# Patient Record
Sex: FEMALE | Race: WHITE | URBAN - METROPOLITAN AREA
[De-identification: names, ages, dates, MRNs, and addresses within clinical notes are randomized per-mention and may not be internally consistent; named-entity substitution may affect disease eponyms.]

---

## 2017-05-30 ENCOUNTER — TRANSFERRED RECORDS (OUTPATIENT)
Dept: HEALTH INFORMATION MANAGEMENT | Facility: CLINIC | Age: 37
End: 2017-05-30

## 2018-10-18 ENCOUNTER — TRANSFERRED RECORDS (OUTPATIENT)
Dept: HEALTH INFORMATION MANAGEMENT | Facility: CLINIC | Age: 38
End: 2018-10-18

## 2018-10-23 ENCOUNTER — TELEPHONE (OUTPATIENT)
Dept: OTOLARYNGOLOGY | Facility: CLINIC | Age: 38
End: 2018-10-23

## 2018-11-08 NOTE — TELEPHONE ENCOUNTER
FUTURE VISIT INFORMATION      FUTURE VISIT INFORMATION:    Date: 11/14/18    Time: 11:50AM    Location: [facility]  REFERRAL INFORMATION:    Referring provider:  DR Scar Elliott     Referring providers clinic:  [facility]     Reason for visit/diagnosis  diverticulum     RECORDS REQUESTED FROM:       Clinic name Comments Records Status Imaging Status   [facility] 2/13/07, 11/24/10, 12/8/11, 12/18/13, 6/2/17, 5/5/18, 10/18/18, 10/18/18 office notes In Drawer    [facility] 5/30/17 XR Esophagram Barium   10/17/13 Esophagram Barium   12/6/11 XR Esophagram Barium  Report: [facility] Only images are for the 5/30/17 in PACS                               11/8/18 5:51PM sent a fax via [employer] for records to be sent from [facility] - [place]  11/12/18 1PM called [facility] to get 5/30/17 Esophagram images pushed - [place]

## 2018-11-11 ENCOUNTER — HEALTH MAINTENANCE LETTER (OUTPATIENT)
Age: 38
End: 2018-11-11

## 2018-11-14 ENCOUNTER — PRE VISIT (OUTPATIENT)
Dept: OTOLARYNGOLOGY | Facility: CLINIC | Age: 38
End: 2018-11-14

## 2018-11-14 ENCOUNTER — TELEPHONE (OUTPATIENT)
Dept: OTOLARYNGOLOGY | Facility: CLINIC | Age: 38
End: 2018-11-14

## 2018-11-14 NOTE — TELEPHONE ENCOUNTER
[facility] Call Center    Phone Message    May a detailed message be left on voicemail: yes    Reason for Call: Other: Pt canceled her appt. on 11/14/18 due to car trouble. I searched through Feb. and couldn't find any appt.s for both providers Marsha and Wiliam on the same day and time.      Action Taken: Message routed to:  [facility] ([facility]): Can you check into it and see if you can find something much sooner, then call the Pt to confirm
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
## 2018-11-28 ENCOUNTER — THERAPY VISIT (OUTPATIENT)
Dept: SPEECH THERAPY | Facility: CLINIC | Age: 38
End: 2018-11-28
Payer: MEDICARE

## 2018-11-28 ENCOUNTER — OFFICE VISIT (OUTPATIENT)
Dept: OTOLARYNGOLOGY | Facility: CLINIC | Age: 38
End: 2018-11-28
Payer: MEDICARE

## 2018-11-28 ENCOUNTER — RADIANT APPOINTMENT (OUTPATIENT)
Dept: GENERAL RADIOLOGY | Facility: CLINIC | Age: 38
End: 2018-11-28
Attending: OTOLARYNGOLOGY
Payer: MEDICARE

## 2018-11-28 VITALS — BODY MASS INDEX: 18.05 KG/M2 | HEIGHT: 67 IN | WEIGHT: 115 LBS

## 2018-11-28 DIAGNOSIS — K22.5 ZENKER'S DIVERTICULUM: Primary | ICD-10-CM

## 2018-11-28 DIAGNOSIS — K22.5 ZENKER'S DIVERTICULUM: ICD-10-CM

## 2018-11-28 DIAGNOSIS — R13.14 PHARYNGOESOPHAGEAL DYSPHAGIA: Primary | ICD-10-CM

## 2018-11-28 RX ORDER — DEXTROAMPHETAMINE SACCHARATE, AMPHETAMINE ASPARTATE, DEXTROAMPHETAMINE SULFATE AND AMPHETAMINE SULFATE 3.75; 3.75; 3.75; 3.75 MG/1; MG/1; MG/1; MG/1
15 TABLET ORAL EVERY MORNING
COMMUNITY

## 2018-11-28 RX ORDER — BARIUM SULFATE 400 MG/ML
10 SUSPENSION ORAL ONCE
Status: COMPLETED | OUTPATIENT
Start: 2018-11-28 | End: 2018-11-28

## 2018-11-28 RX ORDER — SENNOSIDES 8.6 MG
CAPSULE ORAL
COMMUNITY

## 2018-11-28 RX ADMIN — BARIUM SULFATE 10 ML: 400 SUSPENSION ORAL at 10:43

## 2018-11-28 ASSESSMENT — PAIN SCALES - GENERAL: PAINLEVEL: SEVERE PAIN (6)
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
## 2018-11-28 NOTE — MR AVS SNAPSHOT
After Visit Summary   11/28/2018    Kenya Villalta    MRN: [id number]           Patient Information     Date Of Birth          1980        Visit Information        Provider Department      11/28/2018 10:00 AM Yecenia Swain SLP [facility]        Today's Diagnoses     Pharyngoesophageal dysphagia    -  1    Zenker's diverticulum           Follow-ups after your visit        Who to contact     Please call your clinic at [phone] to:    Ask questions about your health    Make or cancel appointments    Discuss your medicines    Learn about your test results    Speak to your doctor            Additional Information About Your Visit        MyChart Information     [email or web address] gives you secure access to your electronic health record. If you see a primary care provider, you can also send messages to your care team and make appointments. If you have questions, please call your primary care clinic.  If you do not have a primary care provider, please call [phone] and they will assist you.      [email or web address] is an electronic gateway that provides easy, online access to your medical records. With [email or web address], you can request a clinic appointment, read your test results, renew a prescription or communicate with your care team.     To access your existing account, please contact your [facility] or call [phone] for assistance.        Care EveryWhere ID     This is your Care EveryWhere ID. This could be used by other organizations to access your [place] medical records  [id number]         Blood Pressure from Last 3 Encounters:   No data found for BP    Weight from Last 3 Encounters:   No data found for Wt              Today, you had the following     No orders found for display         Today's Medication Changes          These changes are accurate as of 11/28/18 11:59 PM.  If you have any questions, ask your nurse or doctor.               Stop taking these medicines if you  haven't already. Please contact your care team if you have questions.     AUGMENTIN PO   Stopped by:  Marcelino Larson MD           FOSAMAX PO   Stopped by:  Marcelino Larson MD           METHADONE HCL PO   Stopped by:  Marcelino Larson MD           REMERON PO   Stopped by:  Marcelino Larson MD                    Primary Care Provider Office Phone # Fax #    Antonino [facility] [phone]       [address]  [place] [address]        Equal Access to Services     [facility]: Hadii aad ku hadasho Soomaali, waaxda luqadaha, qaybta kaalmada adeegyada, waxay idiin hayaan adeeg kharash la'aan . So [facility] [phone].    ATENCIÓN: Si habla español, tiene a vance disposición servicios gratuitos de asistencia lingüística. [facility] [phone].    We comply with applicable federal civil rights laws and [place] laws. We do not discriminate on the basis of race, color, national origin, age, disability, sex, sexual orientation, or gender identity.            Thank you!     Thank you for choosing [facility]  for your care. Our goal is always to provide you with excellent care. Hearing back from our patients is one way we can continue to improve our services. Please take a few minutes to complete the written survey that you may receive in the mail after your visit with us. Thank you!             Your Updated Medication List - Protect others around you: Learn how to safely use, store and throw away your medicines at www.disposemymeds.org.          This list is accurate as of 11/28/18 11:59 PM.  Always use your most recent med list.                   Brand Name Dispense Instructions for use Diagnosis    amphetamine-dextroamphetamine 15 MG tablet    ADDERALL          BACLOFEN PO      Take 20 mg by mouth 4 times daily        OMEPRAZOLE PO      Take by mouth every morning        oxyCODONE-acetaminophen 5-325 MG tablet    PERCOCET     Take by mouth every 6 hours as needed for moderate to severe pain        Polyethylene Glycol  3350-GRX Powd           Senna 8.6 MG Caps

## 2018-11-28 NOTE — MR AVS SNAPSHOT
After Visit Summary   11/28/2018    Kenya Villalta    MRN: [id number]           Patient Information     Date Of Birth          1980        Visit Information        Provider Department      11/28/2018 7:00 AM Marcelino Larson MD [facility] Nose [facility] Th[facility]        Today's Diagnoses     Zenker's diverticulum    -  1       Follow-ups after your visit        Additional Services     Speech Therapy Referral       If you have not heard from the scheduling office within 2 business days, please call [phone] for all locations, with the exception of [place], please call [phone] and [place], please call [phone].    Please be aware that coverage of these services is subject to the terms and limitations of your health insurance plan.  Call member services at your health plan with any benefit or coverage questions.                  Who to contact     Please call your clinic at [phone] to:    Ask questions about your health    Make or cancel appointments    Discuss your medicines    Learn about your test results    Speak to your doctor            Additional Information About Your Visit        [employer] Information     [employer] gives you secure access to your electronic health record. If you see a primary care provider, you can also send messages to your care team and make appointments. If you have questions, please call your primary care clinic.  If you do not have a primary care provider, please call [phone] and they will assist you.      [employer] is an electronic gateway that provides easy, online access to your medical records. With [employer], you can request a clinic appointment, read your test results, renew a prescription or communicate with your care team.     To access your existing account, please contact your [facility] or call [phone] for assistance.        Care EveryWhere ID     This is your Care EveryWhere ID. This could be used by other  "organizations to access your [place] medical records  [id number]        Your Vitals Were     Height BMI (Body Mass Index)                1.702 m (5' 7\") 18.01 kg/m2           Blood Pressure from Last 3 Encounters:   11/03/14 (!) 83/49    Weight from Last 3 Encounters:   11/28/18 52.2 kg (115 lb)   02/16/15 54.4 kg (120 lb)              We Performed the Following     IMAGESTREAM RECORDING ORDER     LARYNGOSCOPY FLEX FIBEROPTIC, DIAGNOSTIC          Today's Medication Changes          These changes are accurate as of 11/28/18  1:51 PM.  If you have any questions, ask your nurse or doctor.               Stop taking these medicines if you haven't already. Please contact your care team if you have questions.     AUGMENTIN PO   Stopped by:  Marcelino Larson MD           FOSAMAX PO   Stopped by:  Marcelino Larson MD           METHADONE HCL PO   Stopped by:  Marcelino Larson MD           REMERON PO   Stopped by:  Marcelino Larson MD                    Primary Care Provider Office Phone # Fax #    [facility] [phone]       [address] [place] [address]        Equal Access to Services     [facility]: Hadii makayla ku hadasho Soomaali, waaxda luqadaha, qaybta kaalmada bhupendra, julio bush . So [facility] [phone].    ATENCIÓN: Si habla español, tiene a vance disposición servicios gratuitos de asistencia lingüística. Llame al [phone].    We comply with applicable federal civil rights laws and [place] laws. We do not discriminate on the basis of race, color, national origin, age, disability, sex, sexual orientation, or gender identity.            Thank you!     Thank you for choosing [facility] EAR [facility]SE [facility] TH[facility]AT  for your care. Our goal is always to provide you with excellent care. Hearing back from our patients is one way we can continue to improve our services. Please take a few minutes to complete the written survey that you may receive in the mail after your visit with us. Thank " you!             Your Updated Medication List - Protect others around you: Learn how to safely use, store and throw away your medicines at www.disposemymeds.org.          This list is accurate as of 11/28/18  1:51 PM.  Always use your most recent med list.                   Brand Name Dispense Instructions for use Diagnosis    amphetamine-dextroamphetamine 15 MG tablet    ADDERALL          BACLOFEN PO      Take 20 mg by mouth 4 times daily        OMEPRAZOLE PO      Take by mouth every morning        oxyCODONE-acetaminophen 5-325 MG tablet    PERCOCET     Take by mouth every 6 hours as needed for moderate to severe pain        Polyethylene Glycol 3350-GRX Powd           Senna 8.6 MG Caps

## 2018-11-28 NOTE — NURSING NOTE
Chief Complaint   Patient presents with     Consult     Zenker's Diverticulum     Leonidas Reddy, EMT

## 2018-12-06 ENCOUNTER — CARE COORDINATION (OUTPATIENT)
Dept: GASTROENTEROLOGY | Facility: CLINIC | Age: 38
End: 2018-12-06

## 2018-12-06 ENCOUNTER — TELEPHONE (OUTPATIENT)
Dept: OTOLARYNGOLOGY | Facility: CLINIC | Age: 38
End: 2018-12-06

## 2018-12-06 ENCOUNTER — TELEPHONE (OUTPATIENT)
Dept: GASTROENTEROLOGY | Facility: CLINIC | Age: 38
End: 2018-12-06

## 2018-12-06 DIAGNOSIS — K22.5 ZENKER'S DIVERTICULUM: Primary | ICD-10-CM

## 2018-12-06 NOTE — TELEPHONE ENCOUNTER
I spoke with Dr. Barahona's clinic they will send a message and have someone call and schedule an appt  Pt is being referred by Dr. Larson to see Azeem, Nabeel Dx Zenker's diverticulum

## 2018-12-06 NOTE — PROGRESS NOTES
[facility] Procedure Referral       Referring provider: Dr. KAISER Larson - ENT     Referred to: [facility]: Specific provider: Jun     Referral Received: 12/6/18    Records received: 12/6/18    Images received: 12/6/18    MD review date: 12/6/18    Requested procedure: EGD w/ endoscopic resection of zenker's diverticulum      Recommendations/Orders:     Per message from Dr. Barahona pt to be scheduled for a clinic visit and then an EGD w/ zenkers diverticulotomy

## 2018-12-06 NOTE — TELEPHONE ENCOUNTER
Called patient to let her know that she should see GI for an endoscopic procedure for her diverticulum. Patient in agreement with recommendations. Message sent to schedulers to assist patient in setting up appointment with Dr. Bobo. RN gave patient contact information for questions/concerns.

## 2018-12-07 ENCOUNTER — TELEPHONE (OUTPATIENT)
Dept: GASTROENTEROLOGY | Facility: CLINIC | Age: 38
End: 2018-12-07

## 2018-12-07 NOTE — TELEPHONE ENCOUNTER
Left VM informing pt I have sent all scheduling information to her [employer]. I left my direct line as well to call if pt does not have access to [employer].    SR 12/7/2018 @ 956 A

## 2018-12-27 NOTE — PROGRESS NOTES
11/28/18 1000   General Information   Type Of Visit Initial   Start Of Care Date 11/28/18   Referring Physician Dr. Marcelino Larson   Orders Evaluate And Treat   Orders Comment Clinical and Video Swallow Studies   Medical Diagnosis Zenker's diverticulum    Onset Of Illness/injury Or Date Of Surgery 11/28/18   Precautions/limitations Swallowing Precautions   Hearing WFL   Pertinent History of Current Problem/OT: Additional Occupational Profile Info Ms. Villalta has a history of spinal cord injury resulting in quadriplegia. She has reported increased difficulty swallowing. She has had several choking episodes. She participated in barium swallow however this was in they laying position due to her inabiilty to stand. She is completing her video swallow study today to see her swallow function in a more natural position and determine risk for aspiration/choking with various consistencies.    Respiratory Status Room air   Prior Level Of Function Swallowing   Prior Level Of Function Comment Soft solids and thin liquids   Patient Role/employment History Employed   Home/community Accessibility Comments (flowsheet Row) Pt is able to get around in her motorized wheelchair.    General Observations Pt highly pleasant and cooperative throughout evaluation.    Patient/family Goals Pt would like to swallow with greater ease and minimize risk for choking.    Clinical Swallow Evaluation   Oral Musculature generally intact   Dentition present and adequate   Mucosal Quality good   Mandibular Strength and Mobility intact   Oral Labial Strength and Mobility WFL   Lingual Strength and Mobility WFL   Velar Elevation intact   Buccal Strength and Mobility intact   Laryngeal Function Throat clear;Cough;Swallow;Voicing initiated   Oral Musculature Comments Pt demonstrates decreased cough strength due to quadriplegia   VFSS Eval: Radiology   Radiologist Resident   Views Taken left lateral;A/P   Physical Location of Procedure [facility]   VFSS  Eval: Thin Liquid Texture Trial   Mode of Presentation, Thin Liquid straw;fed by clinician   Order of Presentation 1,2,3,5   Preparatory Phase WFL   Oral Phase, Thin Liquid WFL   Pharyngeal Phase, Thin Liquid Residue in pyriform sinus  (Zenker's diverticulum)   Rosenbek's Penetration Aspiration Scale: Thin Liquid Trial Results 1 - no aspiration, contrast does not enter airway   Diagnostic Statement No aspiration/penetration noted on thin liquid trials.    VFSS Eval: Nectar Thick Liquid Texture Trial   Mode of Presentation, Nectar straw;fed by clinician   Order of Presentation 6   Preparatory Phase WFL   Oral Phase, Nectar WFL   Pharyngeal Phase, Igo WFL   Rosenbek's Penetration Aspiration Scale: Nectar-Thick Liquid Trial Results 1 - no aspiration, contrast does not enter airway   Diagnostic Statement No aspiration/penetration noted on nectar thick liquid trial.    VFSS Eval: Puree Solid Texture Trial   Mode of Presentation, Puree spoon;fed by clinician   Order of Presentation 4   Preparatory Phase WFL   Oral Phase, Puree WFL   Pharyngeal Phase, Puree WFL;Residue in pyriform sinus  (refluxed from Zenker's )   Rosenbek's Penetration Aspiration Scale: Puree Food Trial Results 1 - no aspiration, contrast does not enter airway   Diagnostic Statement No aspiration/penetration noted on puree consistency trial.  Residue noted which was noted in part due to reflux from Zenker's   Educational Assessment   Barriers to Learning No barriers   Esophageal Phase of Swallow   Esophageal comments Zenker's diverticulum noted which hinders bolus flow.    Swallow Eval: Clinical Impressions   Skilled Criteria for Therapy Intervention Skilled criteria met.  Treatment indicated.   Dysphagia Outcome Severity Scale (BRENNAN) Level 4 - BRENNAN   Treatment Diagnosis Mild to moderate pharyngoesophgeal dysphagia   Diet texture recommendations Regular diet;Thin liquids   Recommended Feeding/Eating Techniques alternate between small bites and sips of  food/liquid;maintain upright posture during/after eating for 30 mins;small sips/bites   Predicted Duration of Therapy Intervention (days/wks) Evaluation only   Risks and Benefits of Treatment have been explained. Yes   Patient, family and/or staff in agreement with Plan of Care Yes   Clinical Impression Comments Ms. Villalta demonstrates mild to moderate pharyngoesophgeal dysphagia as characterized by decreased bolus flow through the pharynx. She is noted to have decreased pharyngeal squeeze and reflux back into pharynx from zenkers diverticulum. Pt demonstrates no aspiration/penetration during evaluation however is at high risk due to minimal cough strength at baseline. She is able to resolve some of the residual with multiple swallows. Recommend she continue with regular soldis and thin liquids. She will continue to take small bites/sips and eat/drink slowly. Management of her Zenker's will improve swallow function as well as minimize her risk for aspiration/choking.    Total Session Time   Total Evaluation Time 30   SLP G-Codes   G-code Swallowing   Swallow Current Status () CK   Swallow Current Status Modifier Rationale Modifier chosen based on the results of this evaluation and reference to RAJAT guidelines   Swallow Goal Status () CK   Swallow Discharge Status () CK

## 2019-01-16 ENCOUNTER — ANESTHESIA EVENT (OUTPATIENT)
Dept: SURGERY | Facility: CLINIC | Age: 39
DRG: 326 | End: 2019-01-16
Payer: MEDICARE

## 2019-01-16 ENCOUNTER — OFFICE VISIT (OUTPATIENT)
Dept: GASTROENTEROLOGY | Facility: CLINIC | Age: 39
End: 2019-01-16
Payer: MEDICARE

## 2019-01-16 ENCOUNTER — OFFICE VISIT (OUTPATIENT)
Dept: SURGERY | Facility: CLINIC | Age: 39
End: 2019-01-16
Payer: MEDICARE

## 2019-01-16 VITALS — TEMPERATURE: 97.3 F | DIASTOLIC BLOOD PRESSURE: 52 MMHG | SYSTOLIC BLOOD PRESSURE: 87 MMHG | HEART RATE: 82 BPM

## 2019-01-16 VITALS
SYSTOLIC BLOOD PRESSURE: 87 MMHG | DIASTOLIC BLOOD PRESSURE: 52 MMHG | OXYGEN SATURATION: 95 % | TEMPERATURE: 97.3 F | HEART RATE: 86 BPM

## 2019-01-16 DIAGNOSIS — K22.5 ZENKER DIVERTICULA: Primary | ICD-10-CM

## 2019-01-16 DIAGNOSIS — Z01.818 PRE-OP EXAMINATION: Primary | ICD-10-CM

## 2019-01-16 DIAGNOSIS — R13.10 DYSPHAGIA: ICD-10-CM

## 2019-01-16 DIAGNOSIS — T17.908A ASPIRATION INTO AIRWAY: ICD-10-CM

## 2019-01-16 ASSESSMENT — LIFESTYLE VARIABLES: TOBACCO_USE: 0

## 2019-01-16 NOTE — NURSING NOTE
Chief Complaint   Patient presents with     Consult     NEW        Vitals:    01/16/19 1449   BP: (!) 87/52   Pulse: 82   Temp: 97.3  F (36.3  C)   TempSrc: Oral       There is no height or weight on file to calculate BMI.      Chris Singletary, EMT on 1/16/2019 at 2:50 PM
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
## 2019-01-16 NOTE — PATIENT INSTRUCTIONS
Preparing for Your Surgery      Name:  Kenya Villalta   MRN:  [id number]   :  1980   Today's Date:  2019     Arriving for surgery: Upper Endoscopy   Surgery date:  2019  Arrival time:  7:30 am  Please come to:       [facility] [address]  [address] [place]  [address]    -   parking is available in front of the hospital from 5:15 am to 8:00 pm    -  Stop at the Information Desk in the lobby    -   Inform the information person that you are here for surgery. An escort to 3c will be provided. If you would not like an escort, please proceed to 3C on the 3rd floor. [phone]     What can I eat or drink?  -  You may have solid food or milk products until 8 hours prior to your surgery. (midnight)  -  You may have water, apple juice or 7up/Sprite until 2 hours prior to your surgery.(until 7:30 am arrival time)    Which medicines can I take?        Stop Aspirin, vitamins and supplements one week prior to surgery.      Hold Ibuprofen and Naproxen for 24 hours prior to surgery.     -  Do NOT take these medications in the morning, the day of surgery:     Adderall      -  Please take these medications the day of surgery:      Baclofen       Omeprazole       How do I prepare myself?  -  Take two showers: one the night before surgery; and one the morning of surgery.         Use Scrubcare or Hibiclens to wash from neck down.  You may use your own shampoo and conditioner. No other hair products.   -  Do NOT use lotion, powder, deodorant, or antiperspirant the day of your surgery.  -  Do NOT wear any makeup, fingernail polish or jewelry.  - Do not bring your own medications to the hospital, except for inhalers and eye drops.  -  Bring your ID and insurance card.      -- Bring bi-pap machine to hospital     Questions or Concerns:  -If you have questions or concerns regarding the day of surgery, please call [phone].       -For questions after surgery please  call your surgeons office.

## 2019-01-17 ENCOUNTER — HOSPITAL ENCOUNTER (INPATIENT)
Facility: CLINIC | Age: 39
LOS: 5 days | Discharge: HOME | DRG: 326 | End: 2019-01-23
Attending: INTERNAL MEDICINE | Admitting: FAMILY MEDICINE
Payer: MEDICARE

## 2019-01-17 ENCOUNTER — ANESTHESIA (OUTPATIENT)
Dept: SURGERY | Facility: CLINIC | Age: 39
DRG: 326 | End: 2019-01-17
Payer: MEDICARE

## 2019-01-17 DIAGNOSIS — K91.89 ESOPHAGEAL ANASTOMOTIC LEAK: ICD-10-CM

## 2019-01-17 DIAGNOSIS — K22.5 ZENKER'S DIVERTICULUM: Primary | ICD-10-CM

## 2019-01-17 DIAGNOSIS — K22.5 ZENKER DIVERTICULA: ICD-10-CM

## 2019-01-17 DIAGNOSIS — R13.10 DYSPHAGIA: ICD-10-CM

## 2019-01-17 DIAGNOSIS — T17.908A ASPIRATION INTO AIRWAY: ICD-10-CM

## 2019-01-17 LAB
BUN SERPL-MCNC: 7 MG/DL (ref 7–30)
ERYTHROCYTE [DISTWIDTH] IN BLOOD BY AUTOMATED COUNT: 14 % (ref 10–15)
GLUCOSE BLDC GLUCOMTR-MCNC: 91 MG/DL (ref 70–99)
HCG UR QL: NEGATIVE
HCT VFR BLD AUTO: 38.9 % (ref 35–47)
HGB BLD-MCNC: 12.8 G/DL (ref 11.7–15.7)
INR PPP: 1.1 (ref 0.86–1.14)
MCH RBC QN AUTO: 29 PG (ref 26.5–33)
MCHC RBC AUTO-ENTMCNC: 32.9 G/DL (ref 31.5–36.5)
MCV RBC AUTO: 88 FL (ref 78–100)
PLATELET # BLD AUTO: 206 10E9/L (ref 150–450)
POTASSIUM SERPL-SCNC: 3.7 MMOL/L (ref 3.4–5.3)
RBC # BLD AUTO: 4.41 10E12/L (ref 3.8–5.2)
UPPER GI ENDOSCOPY: NORMAL
WBC # BLD AUTO: 8.7 10E9/L (ref 4–11)

## 2019-01-17 PROCEDURE — 36000053 ZZH SURGERY LEVEL 2 EA 15 ADDTL MIN - UMMC: Performed by: INTERNAL MEDICINE

## 2019-01-17 PROCEDURE — 00000146 ZZHCL STATISTIC GLUCOSE BY METER IP

## 2019-01-17 PROCEDURE — 25000566 ZZH SEVOFLURANE, EA 15 MIN: Performed by: INTERNAL MEDICINE

## 2019-01-17 PROCEDURE — 84132 ASSAY OF SERUM POTASSIUM: CPT | Performed by: INTERNAL MEDICINE

## 2019-01-17 PROCEDURE — 25000128 H RX IP 250 OP 636: Performed by: NURSE ANESTHETIST, CERTIFIED REGISTERED

## 2019-01-17 PROCEDURE — 37000009 ZZH ANESTHESIA TECHNICAL FEE, EACH ADDTL 15 MIN: Performed by: INTERNAL MEDICINE

## 2019-01-17 PROCEDURE — 25000128 H RX IP 250 OP 636: Performed by: ANESTHESIOLOGY

## 2019-01-17 PROCEDURE — C1769 GUIDE WIRE: HCPCS | Performed by: INTERNAL MEDICINE

## 2019-01-17 PROCEDURE — 71000014 ZZH RECOVERY PHASE 1 LEVEL 2 FIRST HR: Performed by: INTERNAL MEDICINE

## 2019-01-17 PROCEDURE — 37000008 ZZH ANESTHESIA TECHNICAL FEE, 1ST 30 MIN: Performed by: INTERNAL MEDICINE

## 2019-01-17 PROCEDURE — 25000132 ZZH RX MED GY IP 250 OP 250 PS 637: Mod: GY | Performed by: STUDENT IN AN ORGANIZED HEALTH CARE EDUCATION/TRAINING PROGRAM

## 2019-01-17 PROCEDURE — 25000125 ZZHC RX 250: Performed by: NURSE ANESTHETIST, CERTIFIED REGISTERED

## 2019-01-17 PROCEDURE — 25000128 H RX IP 250 OP 636: Performed by: FAMILY MEDICINE

## 2019-01-17 PROCEDURE — 84520 ASSAY OF UREA NITROGEN: CPT | Performed by: INTERNAL MEDICINE

## 2019-01-17 PROCEDURE — 36000051 ZZH SURGERY LEVEL 2 1ST 30 MIN - UMMC: Performed by: INTERNAL MEDICINE

## 2019-01-17 PROCEDURE — A9270 NON-COVERED ITEM OR SERVICE: HCPCS | Performed by: INTERNAL MEDICINE

## 2019-01-17 PROCEDURE — 25000128 H RX IP 250 OP 636: Performed by: STUDENT IN AN ORGANIZED HEALTH CARE EDUCATION/TRAINING PROGRAM

## 2019-01-17 PROCEDURE — 25000125 ZZHC RX 250: Performed by: INTERNAL MEDICINE

## 2019-01-17 PROCEDURE — 36415 COLL VENOUS BLD VENIPUNCTURE: CPT | Performed by: INTERNAL MEDICINE

## 2019-01-17 PROCEDURE — 0K844ZZ DIVISION OF TONGUE, PALATE, PHARYNX MUSCLE, PERCUTANEOUS ENDOSCOPIC APPROACH: ICD-10-PCS | Performed by: INTERNAL MEDICINE

## 2019-01-17 PROCEDURE — 81025 URINE PREGNANCY TEST: CPT | Performed by: ANESTHESIOLOGY

## 2019-01-17 PROCEDURE — G0378 HOSPITAL OBSERVATION PER HR: HCPCS

## 2019-01-17 PROCEDURE — 40000170 ZZH STATISTIC PRE-PROCEDURE ASSESSMENT II: Performed by: INTERNAL MEDICINE

## 2019-01-17 PROCEDURE — 85027 COMPLETE CBC AUTOMATED: CPT | Performed by: INTERNAL MEDICINE

## 2019-01-17 PROCEDURE — A9270 NON-COVERED ITEM OR SERVICE: HCPCS | Mod: GY | Performed by: STUDENT IN AN ORGANIZED HEALTH CARE EDUCATION/TRAINING PROGRAM

## 2019-01-17 PROCEDURE — 85610 PROTHROMBIN TIME: CPT | Performed by: INTERNAL MEDICINE

## 2019-01-17 PROCEDURE — 27210794 ZZH OR GENERAL SUPPLY STERILE: Performed by: INTERNAL MEDICINE

## 2019-01-17 RX ORDER — METHOCARBAMOL 100 MG/ML
1000 INJECTION, SOLUTION INTRAMUSCULAR; INTRAVENOUS
Status: DISCONTINUED | OUTPATIENT
Start: 2019-01-17 | End: 2019-01-18

## 2019-01-17 RX ORDER — ONDANSETRON 2 MG/ML
4 INJECTION INTRAMUSCULAR; INTRAVENOUS EVERY 6 HOURS PRN
Status: CANCELLED | OUTPATIENT
Start: 2019-01-17

## 2019-01-17 RX ORDER — ONDANSETRON 2 MG/ML
4 INJECTION INTRAMUSCULAR; INTRAVENOUS EVERY 6 HOURS PRN
Status: DISCONTINUED | OUTPATIENT
Start: 2019-01-17 | End: 2019-01-24 | Stop reason: HOSPADM

## 2019-01-17 RX ORDER — CEFAZOLIN SODIUM 2 G/100ML
2 INJECTION, SOLUTION INTRAVENOUS
Status: COMPLETED | OUTPATIENT
Start: 2019-01-17 | End: 2019-01-17

## 2019-01-17 RX ORDER — ONDANSETRON 4 MG/1
4 TABLET, ORALLY DISINTEGRATING ORAL EVERY 6 HOURS PRN
Status: CANCELLED | OUTPATIENT
Start: 2019-01-17

## 2019-01-17 RX ORDER — MORPHINE SULFATE 2 MG/ML
1 INJECTION, SOLUTION INTRAMUSCULAR; INTRAVENOUS EVERY 4 HOURS PRN
Status: DISCONTINUED | OUTPATIENT
Start: 2019-01-17 | End: 2019-01-18

## 2019-01-17 RX ORDER — FENTANYL CITRATE 50 UG/ML
INJECTION, SOLUTION INTRAMUSCULAR; INTRAVENOUS PRN
Status: DISCONTINUED | OUTPATIENT
Start: 2019-01-17 | End: 2019-01-17

## 2019-01-17 RX ORDER — DEXAMETHASONE SODIUM PHOSPHATE 4 MG/ML
INJECTION, SOLUTION INTRA-ARTICULAR; INTRALESIONAL; INTRAMUSCULAR; INTRAVENOUS; SOFT TISSUE PRN
Status: DISCONTINUED | OUTPATIENT
Start: 2019-01-17 | End: 2019-01-17

## 2019-01-17 RX ORDER — NALOXONE HYDROCHLORIDE 0.4 MG/ML
.1-.4 INJECTION, SOLUTION INTRAMUSCULAR; INTRAVENOUS; SUBCUTANEOUS
Status: DISCONTINUED | OUTPATIENT
Start: 2019-01-17 | End: 2019-01-17

## 2019-01-17 RX ORDER — DIAZEPAM 10 MG/2ML
2.5 INJECTION, SOLUTION INTRAMUSCULAR; INTRAVENOUS EVERY 4 HOURS PRN
Status: DISCONTINUED | OUTPATIENT
Start: 2019-01-17 | End: 2019-01-17

## 2019-01-17 RX ORDER — LIDOCAINE HYDROCHLORIDE 20 MG/ML
INJECTION, SOLUTION INFILTRATION; PERINEURAL PRN
Status: DISCONTINUED | OUTPATIENT
Start: 2019-01-17 | End: 2019-01-17

## 2019-01-17 RX ORDER — FENTANYL CITRATE 50 UG/ML
25-50 INJECTION, SOLUTION INTRAMUSCULAR; INTRAVENOUS
Status: DISCONTINUED | OUTPATIENT
Start: 2019-01-17 | End: 2019-01-17 | Stop reason: HOSPADM

## 2019-01-17 RX ORDER — SODIUM CHLORIDE, SODIUM LACTATE, POTASSIUM CHLORIDE, CALCIUM CHLORIDE 600; 310; 30; 20 MG/100ML; MG/100ML; MG/100ML; MG/100ML
INJECTION, SOLUTION INTRAVENOUS CONTINUOUS
Status: DISCONTINUED | OUTPATIENT
Start: 2019-01-17 | End: 2019-01-17 | Stop reason: HOSPADM

## 2019-01-17 RX ORDER — NALOXONE HYDROCHLORIDE 0.4 MG/ML
.1-.4 INJECTION, SOLUTION INTRAMUSCULAR; INTRAVENOUS; SUBCUTANEOUS
Status: DISCONTINUED | OUTPATIENT
Start: 2019-01-17 | End: 2019-01-24 | Stop reason: HOSPADM

## 2019-01-17 RX ORDER — ONDANSETRON 2 MG/ML
INJECTION INTRAMUSCULAR; INTRAVENOUS PRN
Status: DISCONTINUED | OUTPATIENT
Start: 2019-01-17 | End: 2019-01-17

## 2019-01-17 RX ORDER — ONDANSETRON 4 MG/1
4 TABLET, ORALLY DISINTEGRATING ORAL EVERY 30 MIN PRN
Status: DISCONTINUED | OUTPATIENT
Start: 2019-01-17 | End: 2019-01-17 | Stop reason: HOSPADM

## 2019-01-17 RX ORDER — BACLOFEN 20 MG/1
20 TABLET ORAL 4 TIMES DAILY
Status: DISCONTINUED | OUTPATIENT
Start: 2019-01-17 | End: 2019-01-17

## 2019-01-17 RX ORDER — PROPOFOL 10 MG/ML
INJECTION, EMULSION INTRAVENOUS PRN
Status: DISCONTINUED | OUTPATIENT
Start: 2019-01-17 | End: 2019-01-17

## 2019-01-17 RX ORDER — FLUMAZENIL 0.1 MG/ML
0.2 INJECTION, SOLUTION INTRAVENOUS
Status: ACTIVE | OUTPATIENT
Start: 2019-01-17 | End: 2019-01-18

## 2019-01-17 RX ORDER — ONDANSETRON 2 MG/ML
4 INJECTION INTRAMUSCULAR; INTRAVENOUS EVERY 30 MIN PRN
Status: DISCONTINUED | OUTPATIENT
Start: 2019-01-17 | End: 2019-01-17 | Stop reason: HOSPADM

## 2019-01-17 RX ORDER — BACLOFEN 20 MG/1
20 TABLET ORAL 4 TIMES DAILY
Status: DISCONTINUED | OUTPATIENT
Start: 2019-01-17 | End: 2019-01-18

## 2019-01-17 RX ORDER — LIDOCAINE 40 MG/G
CREAM TOPICAL
Status: DISCONTINUED | OUTPATIENT
Start: 2019-01-17 | End: 2019-01-17 | Stop reason: HOSPADM

## 2019-01-17 RX ADMIN — BACLOFEN 20 MG: 20 TABLET ORAL at 23:38

## 2019-01-17 RX ADMIN — MORPHINE SULFATE 1 MG: 2 INJECTION, SOLUTION INTRAMUSCULAR; INTRAVENOUS at 20:07

## 2019-01-17 RX ADMIN — LIDOCAINE HYDROCHLORIDE 75 MG: 20 INJECTION, SOLUTION INFILTRATION; PERINEURAL at 09:40

## 2019-01-17 RX ADMIN — SODIUM CHLORIDE 500 ML: 9 INJECTION, SOLUTION INTRAVENOUS at 18:15

## 2019-01-17 RX ADMIN — FENTANYL CITRATE 25 MCG: 50 INJECTION INTRAMUSCULAR; INTRAVENOUS at 12:55

## 2019-01-17 RX ADMIN — MORPHINE SULFATE 1 MG: 2 INJECTION, SOLUTION INTRAMUSCULAR; INTRAVENOUS at 15:30

## 2019-01-17 RX ADMIN — FENTANYL CITRATE 25 MCG: 50 INJECTION INTRAMUSCULAR; INTRAVENOUS at 12:41

## 2019-01-17 RX ADMIN — FENTANYL CITRATE 50 MCG: 50 INJECTION, SOLUTION INTRAMUSCULAR; INTRAVENOUS at 09:40

## 2019-01-17 RX ADMIN — CEFAZOLIN SODIUM 2 G: 2 INJECTION, SOLUTION INTRAVENOUS at 09:58

## 2019-01-17 RX ADMIN — ROCURONIUM BROMIDE 50 MG: 10 INJECTION INTRAVENOUS at 09:40

## 2019-01-17 RX ADMIN — BACLOFEN 20 MG: 20 TABLET ORAL at 19:53

## 2019-01-17 RX ADMIN — SODIUM CHLORIDE, POTASSIUM CHLORIDE, SODIUM LACTATE AND CALCIUM CHLORIDE: 600; 310; 30; 20 INJECTION, SOLUTION INTRAVENOUS at 09:24

## 2019-01-17 RX ADMIN — DEXAMETHASONE SODIUM PHOSPHATE 4 MG: 4 INJECTION, SOLUTION INTRA-ARTICULAR; INTRALESIONAL; INTRAMUSCULAR; INTRAVENOUS; SOFT TISSUE at 10:00

## 2019-01-17 RX ADMIN — SUGAMMADEX 200 MG: 100 INJECTION, SOLUTION INTRAVENOUS at 11:02

## 2019-01-17 RX ADMIN — PROPOFOL 120 MG: 10 INJECTION, EMULSION INTRAVENOUS at 09:40

## 2019-01-17 RX ADMIN — MIDAZOLAM 1 MG: 1 INJECTION INTRAMUSCULAR; INTRAVENOUS at 09:24

## 2019-01-17 RX ADMIN — ONDANSETRON 4 MG: 2 INJECTION INTRAMUSCULAR; INTRAVENOUS at 10:00

## 2019-01-17 ASSESSMENT — ENCOUNTER SYMPTOMS
VOMITING: 0
WEAKNESS: 0
NAUSEA: 0
ABDOMINAL PAIN: 0
HEADACHES: 0
RESPIRATORY NEGATIVE: 1
FEVER: 0
CHILLS: 0
CARDIOVASCULAR NEGATIVE: 1
NECK PAIN: 1

## 2019-01-17 ASSESSMENT — MIFFLIN-ST. JEOR: SCORE: 1250.14
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
## 2019-01-17 NOTE — OR NURSING
Patient with frequent cough in pacu, needing cough assist/quad cough and oral suctioning. Patient coughed up Dr. Jun jo at bedside and said can be expected. Okay if she coughs up more otherwise will be passed through the stool. Will continue to monitor.

## 2019-01-17 NOTE — ANESTHESIA POSTPROCEDURE EVALUATION
Anesthesia POST Procedure Evaluation    Patient: Kenya Villalta   MRN:     [id number] Gender:   female   Age:    38 year old :      1980        Preoperative Diagnosis: Zenker's Diverticulum    Procedure(s):  Upper Endoscopy With Zenker s Diverticulotomy   Postop Comments: No value filed.       Anesthesia Type:  General    Reportable Event: NO     PAIN: Uncomplicated   Sign Out status: Comfortable, Well controlled pain     PONV: No PONV   Sign Out status:  No Nausea or Vomiting     Neuro/Psych: Uneventful perioperative course   Sign Out Status: Preoperative baseline; Age appropriate mentation     Airway/Resp.: Uneventful perioperative course   Sign Out Status: Non labored breathing, age appropriate RR; Resp. Status within EXPECTED Parameters     CV: Uneventful perioperative course   Sign Out status: Appropriate BP and perfusion indices; Appropriate HR/Rhythm     Disposition:   Sign Out in:  PACU  Disposition:  Phase II; Home  Recovery Course: Uneventful  Follow-Up: Not required           Last Anesthesia Record Vitals:  CRNA VITALS  2019 1044 - 2019 1144      2019             Resp Rate (observed):  1  (Abnormal)           Last PACU/Preop Vitals:  Vitals:    19 1215 19 1230 19 1245   BP: 110/87 112/67 114/70   Pulse:  59 70   Resp: 16 16 10   Temp: 36.3  C (97.3  F)     SpO2: 100% 100% 100%         Electronically Signed By: Balwinder Garcia MD, 2019, 1:36 PM

## 2019-01-17 NOTE — ANESTHESIA CARE TRANSFER NOTE
Patient: Kenya Villalta    Procedure(s):  Upper Endoscopy With Zenker s Diverticulotomy    Diagnosis: Zenker's Diverticulum   Diagnosis Additional Information: No value filed.    Anesthesia Type:   No value filed.     Note:  Airway :Nasal Cannula  Patient transferred to:PACU  Comments: Pt extubated in the OR without incident or complications. Pt VSS upon arrival to the PACU. Pt has no c/o pain/N/V. Pt care report given to receiving RN> All questions answered.       Vitals: (Last set prior to Anesthesia Care Transfer)    CRNA VITALS  1/17/2019 1044 - 1/17/2019 1118      1/17/2019             Resp Rate (observed):  1  (Abnormal)                 Electronically Signed By: CIARA Segovia CRNA  January 17, 2019  11:18 AM

## 2019-01-18 ENCOUNTER — APPOINTMENT (OUTPATIENT)
Dept: GENERAL RADIOLOGY | Facility: CLINIC | Age: 39
DRG: 326 | End: 2019-01-18
Attending: INTERNAL MEDICINE
Payer: MEDICARE

## 2019-01-18 ENCOUNTER — APPOINTMENT (OUTPATIENT)
Dept: CT IMAGING | Facility: CLINIC | Age: 39
DRG: 326 | End: 2019-01-18
Attending: INTERNAL MEDICINE
Payer: MEDICARE

## 2019-01-18 LAB
ANION GAP SERPL CALCULATED.3IONS-SCNC: 11 MMOL/L (ref 3–14)
BUN SERPL-MCNC: 6 MG/DL (ref 7–30)
CALCIUM SERPL-MCNC: 8.1 MG/DL (ref 8.5–10.1)
CHLORIDE SERPL-SCNC: 100 MMOL/L (ref 94–109)
CO2 SERPL-SCNC: 24 MMOL/L (ref 20–32)
CREAT SERPL-MCNC: 0.34 MG/DL (ref 0.52–1.04)
ERYTHROCYTE [DISTWIDTH] IN BLOOD BY AUTOMATED COUNT: 14.2 % (ref 10–15)
GFR SERPL CREATININE-BSD FRML MDRD: >90 ML/MIN/{1.73_M2}
GLUCOSE SERPL-MCNC: 98 MG/DL (ref 70–99)
HCT VFR BLD AUTO: 36 % (ref 35–47)
HGB BLD-MCNC: 11.6 G/DL (ref 11.7–15.7)
LACTATE BLD-SCNC: 0.4 MMOL/L (ref 0.7–2)
MCH RBC QN AUTO: 28.7 PG (ref 26.5–33)
MCHC RBC AUTO-ENTMCNC: 32.2 G/DL (ref 31.5–36.5)
MCV RBC AUTO: 89 FL (ref 78–100)
PLATELET # BLD AUTO: 174 10E9/L (ref 150–450)
POTASSIUM SERPL-SCNC: 3.6 MMOL/L (ref 3.4–5.3)
RADIOLOGIST FLAGS: ABNORMAL
RBC # BLD AUTO: 4.04 10E12/L (ref 3.8–5.2)
SODIUM SERPL-SCNC: 135 MMOL/L (ref 133–144)
WBC # BLD AUTO: 15.5 10E9/L (ref 4–11)

## 2019-01-18 PROCEDURE — 36415 COLL VENOUS BLD VENIPUNCTURE: CPT | Performed by: FAMILY MEDICINE

## 2019-01-18 PROCEDURE — 25800025 ZZH RX 258: Performed by: STUDENT IN AN ORGANIZED HEALTH CARE EDUCATION/TRAINING PROGRAM

## 2019-01-18 PROCEDURE — 40000986 XR ESOPHAGRAM

## 2019-01-18 PROCEDURE — 25000128 H RX IP 250 OP 636: Performed by: STUDENT IN AN ORGANIZED HEALTH CARE EDUCATION/TRAINING PROGRAM

## 2019-01-18 PROCEDURE — A9270 NON-COVERED ITEM OR SERVICE: HCPCS | Mod: GY | Performed by: STUDENT IN AN ORGANIZED HEALTH CARE EDUCATION/TRAINING PROGRAM

## 2019-01-18 PROCEDURE — 83605 ASSAY OF LACTIC ACID: CPT | Performed by: FAMILY MEDICINE

## 2019-01-18 PROCEDURE — 36415 COLL VENOUS BLD VENIPUNCTURE: CPT | Performed by: STUDENT IN AN ORGANIZED HEALTH CARE EDUCATION/TRAINING PROGRAM

## 2019-01-18 PROCEDURE — G0378 HOSPITAL OBSERVATION PER HR: HCPCS

## 2019-01-18 PROCEDURE — 80048 BASIC METABOLIC PNL TOTAL CA: CPT | Performed by: STUDENT IN AN ORGANIZED HEALTH CARE EDUCATION/TRAINING PROGRAM

## 2019-01-18 PROCEDURE — 25000128 H RX IP 250 OP 636: Performed by: FAMILY MEDICINE

## 2019-01-18 PROCEDURE — 25000132 ZZH RX MED GY IP 250 OP 250 PS 637: Mod: GY | Performed by: STUDENT IN AN ORGANIZED HEALTH CARE EDUCATION/TRAINING PROGRAM

## 2019-01-18 PROCEDURE — 85027 COMPLETE CBC AUTOMATED: CPT | Performed by: STUDENT IN AN ORGANIZED HEALTH CARE EDUCATION/TRAINING PROGRAM

## 2019-01-18 PROCEDURE — 12000001 ZZH R&B MED SURG/OB UMMC

## 2019-01-18 PROCEDURE — 25000128 H RX IP 250 OP 636: Performed by: RADIOLOGY

## 2019-01-18 PROCEDURE — 70491 CT SOFT TISSUE NECK W/DYE: CPT

## 2019-01-18 RX ORDER — BACLOFEN 20 MG/1
20 TABLET ORAL
Status: DISCONTINUED | OUTPATIENT
Start: 2019-01-18 | End: 2019-01-19 | Stop reason: CLARIF

## 2019-01-18 RX ORDER — METHOCARBAMOL 100 MG/ML
1000 INJECTION, SOLUTION INTRAMUSCULAR; INTRAVENOUS EVERY 8 HOURS
Status: DISCONTINUED | OUTPATIENT
Start: 2019-01-18 | End: 2019-01-18

## 2019-01-18 RX ORDER — MORPHINE SULFATE 2 MG/ML
1 INJECTION, SOLUTION INTRAMUSCULAR; INTRAVENOUS
Status: DISCONTINUED | OUTPATIENT
Start: 2019-01-18 | End: 2019-01-22

## 2019-01-18 RX ORDER — SODIUM CHLORIDE, SODIUM LACTATE, POTASSIUM CHLORIDE, CALCIUM CHLORIDE 600; 310; 30; 20 MG/100ML; MG/100ML; MG/100ML; MG/100ML
INJECTION, SOLUTION INTRAVENOUS CONTINUOUS
Status: DISCONTINUED | OUTPATIENT
Start: 2019-01-18 | End: 2019-01-18

## 2019-01-18 RX ORDER — DEXTROSE MONOHYDRATE, SODIUM CHLORIDE, AND POTASSIUM CHLORIDE 50; 1.49; 4.5 G/1000ML; G/1000ML; G/1000ML
INJECTION, SOLUTION INTRAVENOUS CONTINUOUS
Status: DISCONTINUED | OUTPATIENT
Start: 2019-01-18 | End: 2019-01-19

## 2019-01-18 RX ORDER — BISACODYL 10 MG
10 SUPPOSITORY, RECTAL RECTAL DAILY PRN
Status: DISCONTINUED | OUTPATIENT
Start: 2019-01-18 | End: 2019-01-24 | Stop reason: HOSPADM

## 2019-01-18 RX ORDER — METHOCARBAMOL 100 MG/ML
1000 INJECTION, SOLUTION INTRAMUSCULAR; INTRAVENOUS EVERY 8 HOURS PRN
Status: DISCONTINUED | OUTPATIENT
Start: 2019-01-18 | End: 2019-01-19

## 2019-01-18 RX ORDER — OXYCODONE HYDROCHLORIDE 5 MG/1
5 TABLET ORAL EVERY 4 HOURS PRN
Status: DISCONTINUED | OUTPATIENT
Start: 2019-01-18 | End: 2019-01-18

## 2019-01-18 RX ORDER — PIPERACILLIN SODIUM, TAZOBACTAM SODIUM 3; .375 G/15ML; G/15ML
3.38 INJECTION, POWDER, LYOPHILIZED, FOR SOLUTION INTRAVENOUS EVERY 6 HOURS
Status: DISCONTINUED | OUTPATIENT
Start: 2019-01-18 | End: 2019-01-24 | Stop reason: HOSPADM

## 2019-01-18 RX ORDER — PIPERACILLIN SODIUM, TAZOBACTAM SODIUM 4; .5 G/20ML; G/20ML
4.5 INJECTION, POWDER, LYOPHILIZED, FOR SOLUTION INTRAVENOUS EVERY 6 HOURS
Status: DISCONTINUED | OUTPATIENT
Start: 2019-01-18 | End: 2019-01-18

## 2019-01-18 RX ORDER — OMEPRAZOLE 40 MG/1
40 CAPSULE, DELAYED RELEASE ORAL
Qty: 60 CAPSULE | Refills: 0 | Status: SHIPPED | OUTPATIENT
Start: 2019-01-18 | End: 2019-02-17

## 2019-01-18 RX ORDER — IOPAMIDOL 755 MG/ML
100 INJECTION, SOLUTION INTRAVASCULAR ONCE
Status: COMPLETED | OUTPATIENT
Start: 2019-01-18 | End: 2019-01-18

## 2019-01-18 RX ORDER — ACETAMINOPHEN 325 MG/1
650 TABLET ORAL EVERY 6 HOURS PRN
Status: DISCONTINUED | OUTPATIENT
Start: 2019-01-18 | End: 2019-01-18

## 2019-01-18 RX ADMIN — BACLOFEN 20 MG: 20 TABLET ORAL at 12:25

## 2019-01-18 RX ADMIN — POTASSIUM CHLORIDE, DEXTROSE MONOHYDRATE AND SODIUM CHLORIDE: 150; 5; 450 INJECTION, SOLUTION INTRAVENOUS at 16:57

## 2019-01-18 RX ADMIN — METHOCARBAMOL 1000 MG: 100 INJECTION, SOLUTION INTRAMUSCULAR; INTRAVENOUS at 18:57

## 2019-01-18 RX ADMIN — PIPERACILLIN SODIUM,TAZOBACTAM SODIUM 3.38 G: 3; .375 INJECTION, POWDER, FOR SOLUTION INTRAVENOUS at 22:56

## 2019-01-18 RX ADMIN — IOPAMIDOL 100 ML: 755 INJECTION, SOLUTION INTRAVENOUS at 14:46

## 2019-01-18 RX ADMIN — OMEPRAZOLE 40 MG: 20 CAPSULE, DELAYED RELEASE ORAL at 08:34

## 2019-01-18 RX ADMIN — MORPHINE SULFATE 1 MG: 2 INJECTION, SOLUTION INTRAMUSCULAR; INTRAVENOUS at 22:52

## 2019-01-18 RX ADMIN — MORPHINE SULFATE 1 MG: 2 INJECTION, SOLUTION INTRAMUSCULAR; INTRAVENOUS at 04:18

## 2019-01-18 RX ADMIN — PIPERACILLIN SODIUM,TAZOBACTAM SODIUM 3.38 G: 3; .375 INJECTION, POWDER, FOR SOLUTION INTRAVENOUS at 17:04

## 2019-01-18 RX ADMIN — BACLOFEN 20 MG: 20 TABLET ORAL at 08:35

## 2019-01-18 ASSESSMENT — ENCOUNTER SYMPTOMS
MYALGIAS: 1
NAUSEA: 0
CHILLS: 0
DIFFICULTY URINATING: 0
FEVER: 0
SORE THROAT: 0
COUGH: 1
SHORTNESS OF BREATH: 0
ABDOMINAL PAIN: 0
EYES NEGATIVE: 1
RHINORRHEA: 0
ABDOMINAL DISTENTION: 0
WHEEZING: 0
VOMITING: 0
ARTHRALGIAS: 0
CONFUSION: 0
HEADACHES: 0
DYSURIA: 0
DIZZINESS: 0

## 2019-01-18 ASSESSMENT — PAIN DESCRIPTION - DESCRIPTORS: DESCRIPTORS: ACHING

## 2019-01-18 ASSESSMENT — ACTIVITIES OF DAILY LIVING (ADL): ADLS_ACUITY_SCORE: 25
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
## 2019-01-18 NOTE — PLAN OF CARE
Observation goals PRIOR TO DISCHARGE     Comments: -diagnostic tests and consults completed and resulted : No: labs and esophagram am of 1/18.  -vital signs normal or at patient baseline - Yes  -tolerating oral intake to maintain hydration  Tolerating clear liquids.  -adequate pain control on oral analgesics -Morphine prn, baclofen.  -returns to baseline functional status - Yes  Nurse to notify provider when observation goals have been met and patient is ready for discharge.

## 2019-01-18 NOTE — PLAN OF CARE
Observation goals PRIOR TO DISCHARGE     Comments: -diagnostic tests and consults completed and resulted (esophagram 1/18)   -vital signs normal or at patient baseline - Yes  -tolerating oral intake to maintain hydration -NPO for 6Q6  -adequate pain control on oral analgesics -Morphine prn  -returns to baseline functional status -Not  Nurse to notify provider when observation goals have been met and patient is ready for discharge.

## 2019-01-18 NOTE — PLAN OF CARE
"Observation goals PRIOR TO DISCHARGE     Comments: -diagnostic tests and consults completed and resulted (esophagram 1/18)   -vital signs normal or at patient baseline - Yes  -tolerating oral intake to maintain hydration -Clear liq.  -adequate pain control on oral analgesics -Morphine prn  -returns to baseline functional status -Not  Nurse to notify provider when observation goals have been met and patient is ready for discharge.      Pt ate clear liquids. T/R Q2hrs and PRN. Pain managed well with PRN. Pt was assist with cough manual maneuver. Pt tolerated well, mom at the bedside.Patient and family asked air mattress.  updated and  ordered the bed waiting to come, patient and family  Updated AVSS, IV bolus given x1 BP 94/42   Pulse 91   Temp 98.2  F (36.8  C) (Oral)   Resp 16   Ht 1.702 m (5' 7\")   Wt 53.8 kg (118 lb 8 oz)   SpO2 97%   BMI 18.56 kg/m         "

## 2019-01-18 NOTE — PROVIDER NOTIFICATION
Text paged Becky Re: Pt requested RT to help with cough assist. Per RT states, Cough assist available in the morning. Please advise.

## 2019-01-18 NOTE — PROVIDER NOTIFICATION
Text paged Kristopher Magallanes #[phone] with clarification of plan for Esophogram this am or as outpatient. Thanks, Yuki NEUMANN [phone]

## 2019-01-18 NOTE — PLAN OF CARE
Observation goals PRIOR TO DISCHARGE     Comments: -diagnostic tests and consults completed and resulted : No: labs and esophagram am of 1/18.  -vital signs normal or at patient baseline - Yes  -tolerating oral intake to maintain hydration  Tolerating clear liquids.  -adequate pain control on oral analgesics -Morphine prn, baclofen.  -returns to baseline functional status - Yes, pt C5 quad    Nurse to notify provider when observation goals have been met and patient is ready for discharge.

## 2019-01-18 NOTE — PROGRESS NOTES
RN called about Cough assist this a.m. I spoke to my lead and we would order if pt is admitted or needs cough assist. I spoke to both NEL Mirza and pt (she feels she is ok at this time).
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
## 2019-01-19 ENCOUNTER — APPOINTMENT (OUTPATIENT)
Dept: GENERAL RADIOLOGY | Facility: CLINIC | Age: 39
DRG: 326 | End: 2019-01-19
Attending: INTERNAL MEDICINE
Payer: MEDICARE

## 2019-01-19 LAB
ANION GAP SERPL CALCULATED.3IONS-SCNC: 7 MMOL/L (ref 3–14)
BASOPHILS # BLD AUTO: 0 10E9/L (ref 0–0.2)
BASOPHILS NFR BLD AUTO: 0.1 %
BUN SERPL-MCNC: 6 MG/DL (ref 7–30)
CALCIUM SERPL-MCNC: 8.2 MG/DL (ref 8.5–10.1)
CHLORIDE SERPL-SCNC: 105 MMOL/L (ref 94–109)
CO2 SERPL-SCNC: 25 MMOL/L (ref 20–32)
CREAT SERPL-MCNC: 0.33 MG/DL (ref 0.52–1.04)
DIFFERENTIAL METHOD BLD: ABNORMAL
EOSINOPHIL # BLD AUTO: 0 10E9/L (ref 0–0.7)
EOSINOPHIL NFR BLD AUTO: 0.3 %
ERYTHROCYTE [DISTWIDTH] IN BLOOD BY AUTOMATED COUNT: 14.4 % (ref 10–15)
GFR SERPL CREATININE-BSD FRML MDRD: >90 ML/MIN/{1.73_M2}
GLUCOSE BLDC GLUCOMTR-MCNC: 108 MG/DL (ref 70–99)
GLUCOSE SERPL-MCNC: 115 MG/DL (ref 70–99)
HCT VFR BLD AUTO: 38.8 % (ref 35–47)
HGB BLD-MCNC: 12.4 G/DL (ref 11.7–15.7)
IMM GRANULOCYTES # BLD: 0 10E9/L (ref 0–0.4)
IMM GRANULOCYTES NFR BLD: 0.1 %
LYMPHOCYTES # BLD AUTO: 1.4 10E9/L (ref 0.8–5.3)
LYMPHOCYTES NFR BLD AUTO: 9.8 %
MAGNESIUM SERPL-MCNC: 1.8 MG/DL (ref 1.6–2.3)
MCH RBC QN AUTO: 29.1 PG (ref 26.5–33)
MCHC RBC AUTO-ENTMCNC: 32 G/DL (ref 31.5–36.5)
MCV RBC AUTO: 91 FL (ref 78–100)
MONOCYTES # BLD AUTO: 1 10E9/L (ref 0–1.3)
MONOCYTES NFR BLD AUTO: 7.3 %
NEUTROPHILS # BLD AUTO: 11.8 10E9/L (ref 1.6–8.3)
NEUTROPHILS NFR BLD AUTO: 82.4 %
NRBC # BLD AUTO: 0 10*3/UL
NRBC BLD AUTO-RTO: 0 /100
PHOSPHATE SERPL-MCNC: 2 MG/DL (ref 2.5–4.5)
PLATELET # BLD AUTO: 171 10E9/L (ref 150–450)
POTASSIUM SERPL-SCNC: 3.6 MMOL/L (ref 3.4–5.3)
RBC # BLD AUTO: 4.26 10E12/L (ref 3.8–5.2)
SODIUM SERPL-SCNC: 138 MMOL/L (ref 133–144)
UPPER GI ENDOSCOPY: NORMAL
WBC # BLD AUTO: 14.3 10E9/L (ref 4–11)

## 2019-01-19 PROCEDURE — 99153 MOD SED SAME PHYS/QHP EA: CPT | Performed by: INTERNAL MEDICINE

## 2019-01-19 PROCEDURE — 76000 FLUOROSCOPY <1 HR PHYS/QHP: CPT | Mod: TC

## 2019-01-19 PROCEDURE — 25000128 H RX IP 250 OP 636: Performed by: FAMILY MEDICINE

## 2019-01-19 PROCEDURE — 40000802 ZZH SITE CHECK

## 2019-01-19 PROCEDURE — 25000128 H RX IP 250 OP 636: Performed by: INTERNAL MEDICINE

## 2019-01-19 PROCEDURE — 0DH68UZ INSERTION OF FEEDING DEVICE INTO STOMACH, VIA NATURAL OR ARTIFICIAL OPENING ENDOSCOPIC: ICD-10-PCS | Performed by: INTERNAL MEDICINE

## 2019-01-19 PROCEDURE — 25000128 H RX IP 250 OP 636: Performed by: STUDENT IN AN ORGANIZED HEALTH CARE EDUCATION/TRAINING PROGRAM

## 2019-01-19 PROCEDURE — 27210429 ZZH NUTRITION PRODUCT INTERMEDIATE LITER

## 2019-01-19 PROCEDURE — G0500 MOD SEDAT ENDO SERVICE >5YRS: HCPCS | Performed by: INTERNAL MEDICINE

## 2019-01-19 PROCEDURE — A9270 NON-COVERED ITEM OR SERVICE: HCPCS | Mod: GY | Performed by: STUDENT IN AN ORGANIZED HEALTH CARE EDUCATION/TRAINING PROGRAM

## 2019-01-19 PROCEDURE — 84100 ASSAY OF PHOSPHORUS: CPT | Performed by: STUDENT IN AN ORGANIZED HEALTH CARE EDUCATION/TRAINING PROGRAM

## 2019-01-19 PROCEDURE — 83735 ASSAY OF MAGNESIUM: CPT | Performed by: STUDENT IN AN ORGANIZED HEALTH CARE EDUCATION/TRAINING PROGRAM

## 2019-01-19 PROCEDURE — 43235 EGD DIAGNOSTIC BRUSH WASH: CPT | Performed by: INTERNAL MEDICINE

## 2019-01-19 PROCEDURE — 00000146 ZZHCL STATISTIC GLUCOSE BY METER IP

## 2019-01-19 PROCEDURE — 85025 COMPLETE CBC W/AUTO DIFF WBC: CPT | Performed by: STUDENT IN AN ORGANIZED HEALTH CARE EDUCATION/TRAINING PROGRAM

## 2019-01-19 PROCEDURE — 36415 COLL VENOUS BLD VENIPUNCTURE: CPT | Performed by: STUDENT IN AN ORGANIZED HEALTH CARE EDUCATION/TRAINING PROGRAM

## 2019-01-19 PROCEDURE — 40000556 ZZH STATISTIC PERIPHERAL IV START W US GUIDANCE

## 2019-01-19 PROCEDURE — 25800025 ZZH RX 258: Performed by: STUDENT IN AN ORGANIZED HEALTH CARE EDUCATION/TRAINING PROGRAM

## 2019-01-19 PROCEDURE — 12000001 ZZH R&B MED SURG/OB UMMC

## 2019-01-19 PROCEDURE — 3E0G76Z INTRODUCTION OF NUTRITIONAL SUBSTANCE INTO UPPER GI, VIA NATURAL OR ARTIFICIAL OPENING: ICD-10-PCS | Performed by: INTERNAL MEDICINE

## 2019-01-19 PROCEDURE — 25000132 ZZH RX MED GY IP 250 OP 250 PS 637: Mod: GY | Performed by: STUDENT IN AN ORGANIZED HEALTH CARE EDUCATION/TRAINING PROGRAM

## 2019-01-19 PROCEDURE — 80048 BASIC METABOLIC PNL TOTAL CA: CPT | Performed by: STUDENT IN AN ORGANIZED HEALTH CARE EDUCATION/TRAINING PROGRAM

## 2019-01-19 PROCEDURE — 40000141 ZZH STATISTIC PERIPHERAL IV START W/O US GUIDANCE

## 2019-01-19 RX ORDER — FENTANYL CITRATE 50 UG/ML
INJECTION, SOLUTION INTRAMUSCULAR; INTRAVENOUS PRN
Status: DISCONTINUED | OUTPATIENT
Start: 2019-01-19 | End: 2019-01-19 | Stop reason: HOSPADM

## 2019-01-19 RX ORDER — FLUCONAZOLE 2 MG/ML
400 INJECTION, SOLUTION INTRAVENOUS EVERY 24 HOURS
Status: DISCONTINUED | OUTPATIENT
Start: 2019-01-19 | End: 2019-01-24 | Stop reason: HOSPADM

## 2019-01-19 RX ORDER — SODIUM CHLORIDE AND POTASSIUM CHLORIDE 150; 450 MG/100ML; MG/100ML
INJECTION, SOLUTION INTRAVENOUS CONTINUOUS
Status: DISCONTINUED | OUTPATIENT
Start: 2019-01-19 | End: 2019-01-20

## 2019-01-19 RX ADMIN — Medication 20 MG: at 19:50

## 2019-01-19 RX ADMIN — FLUCONAZOLE 400 MG: 400 INJECTION, SOLUTION INTRAVENOUS at 16:28

## 2019-01-19 RX ADMIN — MORPHINE SULFATE 1 MG: 2 INJECTION, SOLUTION INTRAMUSCULAR; INTRAVENOUS at 19:49

## 2019-01-19 RX ADMIN — METHOCARBAMOL 1000 MG: 100 INJECTION, SOLUTION INTRAMUSCULAR; INTRAVENOUS at 10:03

## 2019-01-19 RX ADMIN — PIPERACILLIN SODIUM,TAZOBACTAM SODIUM 3.38 G: 3; .375 INJECTION, POWDER, FOR SOLUTION INTRAVENOUS at 04:48

## 2019-01-19 RX ADMIN — METHOCARBAMOL 1000 MG: 100 INJECTION, SOLUTION INTRAMUSCULAR; INTRAVENOUS at 03:06

## 2019-01-19 RX ADMIN — POTASSIUM CHLORIDE AND SODIUM CHLORIDE: 450; 150 INJECTION, SOLUTION INTRAVENOUS at 19:14

## 2019-01-19 RX ADMIN — POTASSIUM CHLORIDE, DEXTROSE MONOHYDRATE AND SODIUM CHLORIDE: 150; 5; 450 INJECTION, SOLUTION INTRAVENOUS at 13:23

## 2019-01-19 RX ADMIN — POTASSIUM CHLORIDE, DEXTROSE MONOHYDRATE AND SODIUM CHLORIDE: 150; 5; 450 INJECTION, SOLUTION INTRAVENOUS at 03:17

## 2019-01-19 RX ADMIN — PIPERACILLIN SODIUM,TAZOBACTAM SODIUM 3.38 G: 3; .375 INJECTION, POWDER, FOR SOLUTION INTRAVENOUS at 10:21

## 2019-01-19 RX ADMIN — PIPERACILLIN SODIUM,TAZOBACTAM SODIUM 3.38 G: 3; .375 INJECTION, POWDER, FOR SOLUTION INTRAVENOUS at 19:16

## 2019-01-19 RX ADMIN — BISACODYL 10 MG: 10 SUPPOSITORY RECTAL at 04:54

## 2019-01-19 RX ADMIN — MULTIVITAMIN 15 ML: LIQUID ORAL at 19:50

## 2019-01-19 ASSESSMENT — ENCOUNTER SYMPTOMS
FEVER: 0
NAUSEA: 0
CHILLS: 0
EYES NEGATIVE: 1
VOMITING: 0
ABDOMINAL PAIN: 0
SORE THROAT: 0

## 2019-01-19 ASSESSMENT — ACTIVITIES OF DAILY LIVING (ADL)
ADLS_ACUITY_SCORE: 25
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
## 2019-01-19 NOTE — CONSULTS
IR consult.      Request made for nasoenteric tube placement under fluoro.  This is typically performed by diagnostic radiology in-house residents during a brief time of overlap (where there are two residents on call) on Saturdays from 1:00 PM - 4:30 PM.  Discussed with the ordering team.

## 2019-01-19 NOTE — PHARMACY
Pharmacy Tube Feeding Consult    Medication reviewed for administration by feeding tube and for potential food/drug interactions.    Recommendation: No changes are needed at this time.     Pharmacy will continue to follow as new medications are ordered.      Eleanor Broadbent, PharmD, [facility]  PGY-2 Infectious Diseases Pharmacy Resident  Pager: [phone]  Ext: [phone]
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
## 2019-01-19 NOTE — OR NURSING
Procedure: EGD /c Gastric tube replacement under fluro.  Sedation: Conscious Sedation  Specimens: None.  O2: 2 L/min via NC throughout procedure.  air upon transport to [facility] post-procedure.  Note: Pt tolerated procedure well.   Transport: Pt transferred to [facility],  6511-01 Post-procedure via cart. Siderails elevated dat. Accompanied by transport tech.  Report:  Telephone report called to [facility] RN *[phone] upon completion of procedure.  Total sedation time: 28 minutes  Total fluro time: 60 minutes    Additional Notes: frequent oral suctioning. Careful positioning off back pre, chava et post procedure.    IV Rt mid forearm discontinued pre-procedure. Firm to touch.  Did not irrigate. Vascular access required for new site start prior to procedure start.     Karina Palomo RN  [facility] Endoscopy Unit

## 2019-01-19 NOTE — PROGRESS NOTES
"CLINICAL NUTRITION SERVICES - ASSESSMENT NOTE     Nutrition Prescription    RECOMMENDATIONS FOR MDs/PROVIDERS TO ORDER:  1. Free water/lyte replacement per MD discretion.   2. Prior to starting TF, switch to non-dextrose containing IVF.     Malnutrition Status:    Unable to determine due to patient in Endoscopy room.     Recommendations already ordered by Registered Dietitian (RD):  Isosource 1.5 @ goal 45 ml/hr (1080 ml/day) to provide 1620 kcals (30 kcal/kg/day), 73 g PRO (1.4 g/kg/day), 821 ml free H2O, 190 g CHO and 16 g Fiber daily.  Free Water Flush: 60 ml q4h (for tube patency)   -- Start @ 10 ml/hr and advance by 10 ml q6h. Monitor lytes (Mg, K+, and Phos).     Future/Additional Recommendations:  1. Obtain nutrition hx and NFPE as able.   2. If FWF to provide full hydration, recommend as follows: 130 ml q4h (total fluid = 1601 ml daily)      REASON FOR ASSESSMENT  eKnya Villalta is a/an 38 year old female assessed by the dietitian for Provider Order - Registered Dietitian to Assess and Order TF per Medical Nutrition Therapy Protocol    NUTRITION HISTORY  Per chart review:  MD note on 1/19: \"Completed esophagram 1/18 which showed extravasation below region of Zenker diverticulum consistent with perforation/leak. CT neck confirmed leak at the surgical bed and evidence of early mediastinitis. Strict NPO, NG being placed by GI.\"     Unable to obtain nutrition hx from patient given pt at Endoscopy.    CURRENT NUTRITION ORDERS  Diet: NPO (1/17-present)      LABS  BUN: 6 (L)--may indicate poor protein intake   Cr: 0.33 (L) -- may indicate poor muscle mass     MEDICATIONS  D5 IVF @ 125 ml/hr (150 g CHO and 510 kcals)     ANTHROPOMETRICS  Height: 170.2 cm (5' 7\")  Most Recent Weight: 53.8 kg (118 lb 8 oz)    IBW: 53 - 58 kg (adjusted based on 5% reduction from [employer] insurance table-small frame)   BMI: Normal BMI   Weight History: Wt gain trend over 2 months.   Wt Readings from Last 10 Encounters:   01/17/19 53.8 kg " (118 lb 8 oz)   11/28/18 52.2 kg (115 lb)       Dosing Weight: 54 kg (most recent weight on admit - 1/17)     ASSESSED NUTRITION NEEDS  Estimated Energy Needs: 1350 - 1620 kcals/day (25 - 30 kcals/kg )  Justification: Maintenance  Estimated Protein Needs: 65 - 81 grams protein/day (1.2 - 1.5 grams of pro/kg)  Justification: Increased needs  Estimated Fluid Needs: 1 mL/kcal  Justification: Maintenance and Per provider pending fluid status    PHYSICAL FINDINGS  See malnutrition section below.    MALNUTRITION  % Intake: Unable to assess  % Weight Loss: None noted  Subcutaneous Fat Loss: Unable to assess  Muscle Loss: Unable to assess  Fluid Accumulation/Edema: None noted (RN flowsheet)   Malnutrition Diagnosis: Unable to determine due to patient at Endoscopy.     NUTRITION DIAGNOSIS  Inadequate protein-energy intake related to NPO diet status as evidenced by NPO diet (x3d), which provides 0% of estimated nutritional needs.       INTERVENTIONS  Implementation  Nutrition Education: Unable to complete due to in patient unavailable (in Endoscopy for procedure).   Enteral Nutrition - Initiate (see above)   Feeding tube flush     Goals  Total avg nutritional intake to meet a minimum of 25 kcal/kg and 1.2 g PRO/kg daily (per dosing wt 54 kg).     Monitoring/Evaluation  Progress toward goals will be monitored and evaluated per protocol.      Avis Sterling RD, LD   Weekend pager [phone]

## 2019-01-19 NOTE — PROGRESS NOTES
Patient returned to unit from Endoscopy with NG feeding tube, report received from Kimber NEUMANN in endoscopy with report and confirmation that NG is confirmed and ready to use.
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
## 2019-01-19 NOTE — OP NOTE
Upper GI Endoscopy 01/19/2019  1:23 PM [facility], [address] [place] [address] [phone]     Endoscopy Department   _______________________________________________________________________________   Patient Name: Kenya Villalta           Procedure Date: 1/19/2019 1:23 PM   MRN: [id number]                       Account Number: [id number]   YOB: 1980             Admit Type: Inpatient   Age: 38                                Gender: Female   Note Status: Finalized                Attending MD: Jeramie Barahona MD   Pause for the Cause: time out performed Total Sedation Time:   _______________________________________________________________________________       Procedure:           Upper GI endoscopy   Indications:         Management of operative complication from previous                        surgery; leak at Zenker's septotomy site; Plan for                        endoscopic placement of NG tube for feeding   Providers:           Jeramie Barahona MD, Sowmya Robert RN, Karina Tate RN   Referring MD:           Medicines:           Midazolam 6 mg IV, Fentanyl 250 micrograms IV   Complications:       No immediate complications. Estimated blood loss:                        Minimal.   _______________________________________________________________________________   Procedure:           Pre-Anesthesia Assessment:                        - Prior to the procedure, a History and Physical was                        performed, and patient medications and allergies were                        reviewed. The patient is competent. The risks and                        benefits of the procedure and the sedation options and                        risks were discussed with the patient. All questions                        were answered and informed consent was obtained. Patient                        identification and proposed procedure were  verified by                        the physician and the nurse in the procedure room.                        Mental Status Examination: alert and oriented. Airway                        Examination: normal oropharyngeal airway and neck                        mobility. Respiratory Examination: clear to                        auscultation. CV Examination: normal. Prophylactic                        Antibiotics: The patient does not require prophylactic                        antibiotics. Prior Anticoagulants: The patient has taken                        no previous anticoagulant or antiplatelet agents. ASA                        Grade Assessment: III - A patient with severe systemic                        disease. After reviewing the risks and benefits, the                        patient was deemed in satisfactory condition to undergo                        the procedure. The anesthesia plan was to use moderate                        sedation / analgesia (conscious sedation). Immediately                        prior to administration of medications, the patient was                        re-assessed for adequacy to receive sedatives. The heart                        rate, respiratory rate, oxygen saturations, blood                        pressure, adequacy of pulmonary ventilation, and                        response to care were monitored throughout the                        procedure. The physical status of the patient was                        re-assessed after the procedure.                        After obtaining informed consent, the endoscope was                        passed under direct vision. Throughout the procedure,                        the patient's blood pressure, pulse, and oxygen                        saturations were monitored continuously. The Endoscope                        was introduced through the left nostril, and advanced to                        the second part of duodenum. The upper GI  endoscopy was                        accomplished without difficulty. The patient tolerated                        the procedure well.                                                                                    Findings:        Fluoroscopy utilized for this procedure. Pediatric gastroscope advanced        through left nare. Minimal to no CO2 insufflation used to minimize        further leak through perforation site. Three endoclips were seen in the        upper third of the esophagus. A thorough exam was not performed. A guide        wire was inserted into the antrum and the endoscope was removed. A 10 Fr        nasogastric tube with the weighted end removed to facilitate passage was        advanced over the guide wire into the antrum. Placement was confirmed by        fluoroscopy. Tube at 80 cm at the left nare.                                                                                     Moderate Sedation:        Moderate (conscious) sedation was administered by the endoscopy nurse        and supervised by the endoscopist. The patient's oxygen saturation,        heart rate, blood pressure and response to care were monitored. Total        physician intraservice time was 28 minutes.   Impression:          - Transnasal endoscopy performed via left nare and                        fluoroscopy utilized throughout procedure.                        - Minimal to no CO2 insufflation used throughout                        procedure and a detailed exam was not performed to avoid                        trauma/extension of the esophageal leak.                        - A 10 Fr Nasogastric feeding tube was successfully                        placed under fluoroscopic guidance.                        - No specimens collected.   Recommendation:      - Return patient to [facility] for ongoing care.                        - Nasogastric tube can be used immediately for tube                        feeding.                         - Keep patient NPO and head of bed elevated to 30-45                        degrees                        - GI to continue following                                                                                       Jeramie Barahona MD
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
## 2019-01-20 PROBLEM — K91.89 ESOPHAGEAL ANASTOMOTIC LEAK: Status: ACTIVE | Noted: 2019-01-20

## 2019-01-20 LAB
ANION GAP SERPL CALCULATED.3IONS-SCNC: 7 MMOL/L (ref 3–14)
BUN SERPL-MCNC: 4 MG/DL (ref 7–30)
CALCIUM SERPL-MCNC: 8.2 MG/DL (ref 8.5–10.1)
CHLORIDE SERPL-SCNC: 105 MMOL/L (ref 94–109)
CO2 SERPL-SCNC: 24 MMOL/L (ref 20–32)
CREAT SERPL-MCNC: 0.28 MG/DL (ref 0.52–1.04)
CRP SERPL-MCNC: 140 MG/L (ref 0–8)
ERYTHROCYTE [DISTWIDTH] IN BLOOD BY AUTOMATED COUNT: 14.1 % (ref 10–15)
GFR SERPL CREATININE-BSD FRML MDRD: >90 ML/MIN/{1.73_M2}
GLUCOSE BLDC GLUCOMTR-MCNC: 138 MG/DL (ref 70–99)
GLUCOSE SERPL-MCNC: 94 MG/DL (ref 70–99)
HCT VFR BLD AUTO: 35.7 % (ref 35–47)
HGB BLD-MCNC: 11.2 G/DL (ref 11.7–15.7)
MAGNESIUM SERPL-MCNC: 1.8 MG/DL (ref 1.6–2.3)
MCH RBC QN AUTO: 28.5 PG (ref 26.5–33)
MCHC RBC AUTO-ENTMCNC: 31.4 G/DL (ref 31.5–36.5)
MCV RBC AUTO: 91 FL (ref 78–100)
PHOSPHATE SERPL-MCNC: 2.8 MG/DL (ref 2.5–4.5)
PLATELET # BLD AUTO: 189 10E9/L (ref 150–450)
POTASSIUM SERPL-SCNC: 3.6 MMOL/L (ref 3.4–5.3)
RBC # BLD AUTO: 3.93 10E12/L (ref 3.8–5.2)
SODIUM SERPL-SCNC: 136 MMOL/L (ref 133–144)
WBC # BLD AUTO: 9.5 10E9/L (ref 4–11)

## 2019-01-20 PROCEDURE — 25000128 H RX IP 250 OP 636: Performed by: FAMILY MEDICINE

## 2019-01-20 PROCEDURE — 27210429 ZZH NUTRITION PRODUCT INTERMEDIATE LITER

## 2019-01-20 PROCEDURE — 25000132 ZZH RX MED GY IP 250 OP 250 PS 637: Mod: GY | Performed by: STUDENT IN AN ORGANIZED HEALTH CARE EDUCATION/TRAINING PROGRAM

## 2019-01-20 PROCEDURE — 12000001 ZZH R&B MED SURG/OB UMMC

## 2019-01-20 PROCEDURE — C9113 INJ PANTOPRAZOLE SODIUM, VIA: HCPCS | Performed by: FAMILY MEDICINE

## 2019-01-20 PROCEDURE — 25000132 ZZH RX MED GY IP 250 OP 250 PS 637: Mod: GY | Performed by: FAMILY MEDICINE

## 2019-01-20 PROCEDURE — 25800025 ZZH RX 258: Performed by: STUDENT IN AN ORGANIZED HEALTH CARE EDUCATION/TRAINING PROGRAM

## 2019-01-20 PROCEDURE — 83735 ASSAY OF MAGNESIUM: CPT | Performed by: STUDENT IN AN ORGANIZED HEALTH CARE EDUCATION/TRAINING PROGRAM

## 2019-01-20 PROCEDURE — A9270 NON-COVERED ITEM OR SERVICE: HCPCS | Mod: GY | Performed by: FAMILY MEDICINE

## 2019-01-20 PROCEDURE — 25000128 H RX IP 250 OP 636: Performed by: STUDENT IN AN ORGANIZED HEALTH CARE EDUCATION/TRAINING PROGRAM

## 2019-01-20 PROCEDURE — 84100 ASSAY OF PHOSPHORUS: CPT | Performed by: STUDENT IN AN ORGANIZED HEALTH CARE EDUCATION/TRAINING PROGRAM

## 2019-01-20 PROCEDURE — 85027 COMPLETE CBC AUTOMATED: CPT | Performed by: STUDENT IN AN ORGANIZED HEALTH CARE EDUCATION/TRAINING PROGRAM

## 2019-01-20 PROCEDURE — 00000146 ZZHCL STATISTIC GLUCOSE BY METER IP

## 2019-01-20 PROCEDURE — A9270 NON-COVERED ITEM OR SERVICE: HCPCS | Mod: GY | Performed by: STUDENT IN AN ORGANIZED HEALTH CARE EDUCATION/TRAINING PROGRAM

## 2019-01-20 PROCEDURE — 80048 BASIC METABOLIC PNL TOTAL CA: CPT | Performed by: STUDENT IN AN ORGANIZED HEALTH CARE EDUCATION/TRAINING PROGRAM

## 2019-01-20 PROCEDURE — 36415 COLL VENOUS BLD VENIPUNCTURE: CPT | Performed by: STUDENT IN AN ORGANIZED HEALTH CARE EDUCATION/TRAINING PROGRAM

## 2019-01-20 PROCEDURE — 86140 C-REACTIVE PROTEIN: CPT | Performed by: STUDENT IN AN ORGANIZED HEALTH CARE EDUCATION/TRAINING PROGRAM

## 2019-01-20 RX ORDER — DEXTROSE MONOHYDRATE, SODIUM CHLORIDE, AND POTASSIUM CHLORIDE 50; 1.49; 4.5 G/1000ML; G/1000ML; G/1000ML
INJECTION, SOLUTION INTRAVENOUS CONTINUOUS
Status: DISCONTINUED | OUTPATIENT
Start: 2019-01-20 | End: 2019-01-21

## 2019-01-20 RX ORDER — HYDROXYZINE HCL 10 MG/5 ML
25-50 SOLUTION, ORAL ORAL ONCE
Status: COMPLETED | OUTPATIENT
Start: 2019-01-20 | End: 2019-01-20

## 2019-01-20 RX ADMIN — MORPHINE SULFATE 1 MG: 2 INJECTION, SOLUTION INTRAMUSCULAR; INTRAVENOUS at 06:14

## 2019-01-20 RX ADMIN — Medication 220 MG: at 15:15

## 2019-01-20 RX ADMIN — PIPERACILLIN SODIUM,TAZOBACTAM SODIUM 3.38 G: 3; .375 INJECTION, POWDER, FOR SOLUTION INTRAVENOUS at 13:14

## 2019-01-20 RX ADMIN — PIPERACILLIN SODIUM,TAZOBACTAM SODIUM 3.38 G: 3; .375 INJECTION, POWDER, FOR SOLUTION INTRAVENOUS at 00:12

## 2019-01-20 RX ADMIN — PIPERACILLIN SODIUM,TAZOBACTAM SODIUM 3.38 G: 3; .375 INJECTION, POWDER, FOR SOLUTION INTRAVENOUS at 08:38

## 2019-01-20 RX ADMIN — HYDROXYZINE HYDROCHLORIDE 25 MG: 10 SYRUP ORAL at 17:56

## 2019-01-20 RX ADMIN — Medication 20 MG: at 12:56

## 2019-01-20 RX ADMIN — MORPHINE SULFATE 1 MG: 2 INJECTION, SOLUTION INTRAMUSCULAR; INTRAVENOUS at 13:18

## 2019-01-20 RX ADMIN — ONDANSETRON 4 MG: 2 INJECTION INTRAMUSCULAR; INTRAVENOUS at 16:30

## 2019-01-20 RX ADMIN — Medication 20 MG: at 08:45

## 2019-01-20 RX ADMIN — BISACODYL 10 MG: 10 SUPPOSITORY RECTAL at 03:22

## 2019-01-20 RX ADMIN — MORPHINE SULFATE 1 MG: 2 INJECTION, SOLUTION INTRAMUSCULAR; INTRAVENOUS at 00:12

## 2019-01-20 RX ADMIN — Medication 20 MG: at 19:51

## 2019-01-20 RX ADMIN — PANTOPRAZOLE SODIUM 40 MG: 40 INJECTION, POWDER, FOR SOLUTION INTRAVENOUS at 11:43

## 2019-01-20 RX ADMIN — Medication 220 MG: at 19:51

## 2019-01-20 RX ADMIN — Medication 20 MG: at 15:15

## 2019-01-20 RX ADMIN — PIPERACILLIN SODIUM,TAZOBACTAM SODIUM 3.38 G: 3; .375 INJECTION, POWDER, FOR SOLUTION INTRAVENOUS at 19:50

## 2019-01-20 RX ADMIN — POTASSIUM CHLORIDE, DEXTROSE MONOHYDRATE AND SODIUM CHLORIDE: 150; 5; 450 INJECTION, SOLUTION INTRAVENOUS at 23:09

## 2019-01-20 RX ADMIN — MULTIVITAMIN 15 ML: LIQUID ORAL at 08:45

## 2019-01-20 RX ADMIN — FLUCONAZOLE 400 MG: 400 INJECTION, SOLUTION INTRAVENOUS at 15:16

## 2019-01-20 ASSESSMENT — ENCOUNTER SYMPTOMS
EYES NEGATIVE: 1
SORE THROAT: 0
NAUSEA: 0
FEVER: 0
ABDOMINAL PAIN: 0
VOMITING: 0
CHILLS: 0

## 2019-01-20 ASSESSMENT — ACTIVITIES OF DAILY LIVING (ADL)
ADLS_ACUITY_SCORE: 25

## 2019-01-20 NOTE — PROGRESS NOTES
GI Fellow Brief Note:    Ms. Villalta is s/p uneventful placement of NG yesterday. She is stable today and feels better than yesterday. There have been no acute events in the last 24 hours. Tube feeds have been running at goal and she is tolerating them well. She has remained afebrile and white count has normalized.     Plan would be to repeat an esophagram to evaluate the leak on Wednesday. Tube feeds should be continued at goal for her nutrition. Decision about oral intake can be made after her esophagram results on Wednesday.    Yanira Fry  Gastroenterology Fellow  P [phone]
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
## 2019-01-20 NOTE — PROGRESS NOTES
Ms Villalta looks better. She sitting up in a chair playing cribbage with her mother. Her spirits are definitely improved and she is tolerating the NG feeding tube. Her neck is less tender as well.  She remains afebrile and WBC down to 9.5.    I am hopeful she is sealing this likely small perforation on her own. Will defer to Dr. Barahona on the timing of restarting an oral diet, but I would likely give her close to at least a few more days to heal.  Will follow.

## 2019-01-21 ENCOUNTER — APPOINTMENT (OUTPATIENT)
Dept: OCCUPATIONAL THERAPY | Facility: CLINIC | Age: 39
DRG: 326 | End: 2019-01-21
Attending: INTERNAL MEDICINE
Payer: MEDICARE

## 2019-01-21 PROBLEM — L89.151 PRESSURE INJURY OF COCCYGEAL REGION, STAGE 1: Status: ACTIVE | Noted: 2019-01-21

## 2019-01-21 LAB
ANION GAP SERPL CALCULATED.3IONS-SCNC: 5 MMOL/L (ref 3–14)
BASOPHILS # BLD AUTO: 0 10E9/L (ref 0–0.2)
BASOPHILS NFR BLD AUTO: 0.3 %
BUN SERPL-MCNC: 4 MG/DL (ref 7–30)
CALCIUM SERPL-MCNC: 8.4 MG/DL (ref 8.5–10.1)
CHLORIDE SERPL-SCNC: 108 MMOL/L (ref 94–109)
CO2 SERPL-SCNC: 26 MMOL/L (ref 20–32)
CREAT SERPL-MCNC: 0.31 MG/DL (ref 0.52–1.04)
CRP SERPL-MCNC: 79 MG/L (ref 0–8)
DIFFERENTIAL METHOD BLD: ABNORMAL
EOSINOPHIL # BLD AUTO: 0.2 10E9/L (ref 0–0.7)
EOSINOPHIL NFR BLD AUTO: 2.2 %
ERYTHROCYTE [DISTWIDTH] IN BLOOD BY AUTOMATED COUNT: 13.8 % (ref 10–15)
GFR SERPL CREATININE-BSD FRML MDRD: >90 ML/MIN/{1.73_M2}
GLUCOSE BLDC GLUCOMTR-MCNC: 121 MG/DL (ref 70–99)
GLUCOSE SERPL-MCNC: 100 MG/DL (ref 70–99)
HCT VFR BLD AUTO: 35.9 % (ref 35–47)
HGB BLD-MCNC: 11.3 G/DL (ref 11.7–15.7)
IMM GRANULOCYTES # BLD: 0 10E9/L (ref 0–0.4)
IMM GRANULOCYTES NFR BLD: 0.3 %
LYMPHOCYTES # BLD AUTO: 1.9 10E9/L (ref 0.8–5.3)
LYMPHOCYTES NFR BLD AUTO: 27.8 %
MCH RBC QN AUTO: 28.5 PG (ref 26.5–33)
MCHC RBC AUTO-ENTMCNC: 31.5 G/DL (ref 31.5–36.5)
MCV RBC AUTO: 90 FL (ref 78–100)
MONOCYTES # BLD AUTO: 0.5 10E9/L (ref 0–1.3)
MONOCYTES NFR BLD AUTO: 7.6 %
NEUTROPHILS # BLD AUTO: 4.3 10E9/L (ref 1.6–8.3)
NEUTROPHILS NFR BLD AUTO: 61.8 %
NRBC # BLD AUTO: 0 10*3/UL
NRBC BLD AUTO-RTO: 0 /100
PHOSPHATE SERPL-MCNC: 2.7 MG/DL (ref 2.5–4.5)
PLATELET # BLD AUTO: 205 10E9/L (ref 150–450)
POTASSIUM SERPL-SCNC: 3.8 MMOL/L (ref 3.4–5.3)
RBC # BLD AUTO: 3.97 10E12/L (ref 3.8–5.2)
SODIUM SERPL-SCNC: 139 MMOL/L (ref 133–144)
WBC # BLD AUTO: 6.9 10E9/L (ref 4–11)

## 2019-01-21 PROCEDURE — 97530 THERAPEUTIC ACTIVITIES: CPT | Mod: GO

## 2019-01-21 PROCEDURE — 25000132 ZZH RX MED GY IP 250 OP 250 PS 637: Mod: GY | Performed by: STUDENT IN AN ORGANIZED HEALTH CARE EDUCATION/TRAINING PROGRAM

## 2019-01-21 PROCEDURE — 25000128 H RX IP 250 OP 636: Performed by: FAMILY MEDICINE

## 2019-01-21 PROCEDURE — 86140 C-REACTIVE PROTEIN: CPT | Performed by: STUDENT IN AN ORGANIZED HEALTH CARE EDUCATION/TRAINING PROGRAM

## 2019-01-21 PROCEDURE — C9113 INJ PANTOPRAZOLE SODIUM, VIA: HCPCS | Performed by: FAMILY MEDICINE

## 2019-01-21 PROCEDURE — 25000128 H RX IP 250 OP 636: Performed by: STUDENT IN AN ORGANIZED HEALTH CARE EDUCATION/TRAINING PROGRAM

## 2019-01-21 PROCEDURE — A9270 NON-COVERED ITEM OR SERVICE: HCPCS | Mod: GY | Performed by: FAMILY MEDICINE

## 2019-01-21 PROCEDURE — A9270 NON-COVERED ITEM OR SERVICE: HCPCS | Mod: GY | Performed by: STUDENT IN AN ORGANIZED HEALTH CARE EDUCATION/TRAINING PROGRAM

## 2019-01-21 PROCEDURE — 27210429 ZZH NUTRITION PRODUCT INTERMEDIATE LITER

## 2019-01-21 PROCEDURE — 25000132 ZZH RX MED GY IP 250 OP 250 PS 637: Mod: GY | Performed by: FAMILY MEDICINE

## 2019-01-21 PROCEDURE — 40000133 ZZH STATISTIC OT WARD VISIT

## 2019-01-21 PROCEDURE — 85025 COMPLETE CBC W/AUTO DIFF WBC: CPT | Performed by: FAMILY MEDICINE

## 2019-01-21 PROCEDURE — 97165 OT EVAL LOW COMPLEX 30 MIN: CPT | Mod: GO

## 2019-01-21 PROCEDURE — G0463 HOSPITAL OUTPT CLINIC VISIT: HCPCS

## 2019-01-21 PROCEDURE — 80048 BASIC METABOLIC PNL TOTAL CA: CPT | Performed by: STUDENT IN AN ORGANIZED HEALTH CARE EDUCATION/TRAINING PROGRAM

## 2019-01-21 PROCEDURE — 84100 ASSAY OF PHOSPHORUS: CPT | Performed by: STUDENT IN AN ORGANIZED HEALTH CARE EDUCATION/TRAINING PROGRAM

## 2019-01-21 PROCEDURE — 00000146 ZZHCL STATISTIC GLUCOSE BY METER IP

## 2019-01-21 PROCEDURE — 36415 COLL VENOUS BLD VENIPUNCTURE: CPT | Performed by: STUDENT IN AN ORGANIZED HEALTH CARE EDUCATION/TRAINING PROGRAM

## 2019-01-21 PROCEDURE — 12000012 ZZH R&B MS OVERFLOW UMMC

## 2019-01-21 RX ADMIN — Medication 20 MG: at 20:35

## 2019-01-21 RX ADMIN — PIPERACILLIN SODIUM,TAZOBACTAM SODIUM 3.38 G: 3; .375 INJECTION, POWDER, FOR SOLUTION INTRAVENOUS at 08:01

## 2019-01-21 RX ADMIN — Medication 20 MG: at 08:01

## 2019-01-21 RX ADMIN — Medication 220 MG: at 21:56

## 2019-01-21 RX ADMIN — Medication 20 MG: at 12:00

## 2019-01-21 RX ADMIN — PIPERACILLIN SODIUM,TAZOBACTAM SODIUM 3.38 G: 3; .375 INJECTION, POWDER, FOR SOLUTION INTRAVENOUS at 15:14

## 2019-01-21 RX ADMIN — Medication 1 MG: at 20:35

## 2019-01-21 RX ADMIN — Medication 220 MG: at 08:01

## 2019-01-21 RX ADMIN — SALINE NASAL SPRAY 1 SPRAY: 1.5 SOLUTION NASAL at 21:57

## 2019-01-21 RX ADMIN — PANTOPRAZOLE SODIUM 40 MG: 40 INJECTION, POWDER, FOR SOLUTION INTRAVENOUS at 08:01

## 2019-01-21 RX ADMIN — PIPERACILLIN SODIUM,TAZOBACTAM SODIUM 3.38 G: 3; .375 INJECTION, POWDER, FOR SOLUTION INTRAVENOUS at 02:23

## 2019-01-21 RX ADMIN — Medication 20 MG: at 15:11

## 2019-01-21 RX ADMIN — Medication 220 MG: at 15:11

## 2019-01-21 RX ADMIN — FLUCONAZOLE 400 MG: 400 INJECTION, SOLUTION INTRAVENOUS at 13:51

## 2019-01-21 RX ADMIN — PIPERACILLIN SODIUM,TAZOBACTAM SODIUM 3.38 G: 3; .375 INJECTION, POWDER, FOR SOLUTION INTRAVENOUS at 20:34

## 2019-01-21 RX ADMIN — MULTIVITAMIN 15 ML: LIQUID ORAL at 08:01

## 2019-01-21 ASSESSMENT — ACTIVITIES OF DAILY LIVING (ADL)
RETIRED_COMMUNICATION: 0-->UNDERSTANDS/COMMUNICATES WITHOUT DIFFICULTY
TOILETING: 4-->COMPLETELY DEPENDENT
TRANSFERRING: 4-->COMPLETELY DEPENDENT
DRESS: 4-->COMPLETELY DEPENDENT
ADLS_ACUITY_SCORE: 25
ADLS_ACUITY_SCORE: 25
BATHING: 4-->COMPLETELY DEPENDENT
ADLS_ACUITY_SCORE: 38
ADLS_ACUITY_SCORE: 25
AMBULATION: 4-->COMPLETELY DEPENDENT
PREVIOUS_RESPONSIBILITIES: MEAL PREP;HOUSEKEEPING;LAUNDRY;SHOPPING;MEDICATION MANAGEMENT;DRIVING;WORK
SWALLOWING: 0-->SWALLOWS FOODS/LIQUIDS WITHOUT DIFFICULTY
ADLS_ACUITY_SCORE: 25
RETIRED_EATING: 4-->COMPLETELY DEPENDENT
FALL_HISTORY_WITHIN_LAST_SIX_MONTHS: NO
COGNITION: 0 - NO COGNITION ISSUES REPORTED
ADLS_ACUITY_SCORE: 31

## 2019-01-21 ASSESSMENT — ENCOUNTER SYMPTOMS
NAUSEA: 0
ABDOMINAL PAIN: 0
SORE THROAT: 0
EYES NEGATIVE: 1
FEVER: 0
VOMITING: 0
CHILLS: 0

## 2019-01-21 NOTE — PROGRESS NOTES
Pt A&O, VSS on RA. Remains on strict NPO. Receiving tube feeding through NG tube Nutrition Isosource 1.5 Jeb. Feeding was running at 40 ml/hr until 1700. New bag hung and tubing replaced, rate increased to 50 ml/hr at 1800. Feeding ran at 50 ml/hr until 2100. Pt not receiving any tube feedings overnight because of request to lay flat in bed, team aware. Tube feeding will restart at 0500. Reported nausea at 1600, was given IV zofran which she reported helped and has not reported any nausea otherwise. Pt periodically tearful and anxious about NG tube but overall accepting of the situation. Younker suction at bedside for oral secretions. B.S. At 1854 138. Suprapubic catheter to campuzano bag, draining clear yellow urine. Pressure injury to coccyx, dark red area in center nonblanchable, with surrounding blanchable erythema. Mepiplex foam dressing in place. Pt up in electric chair for the evening with seat cushion, mechanical lift used and assist x2. Repositioning in bed every 2 hours.   IV fluids running at 50 ml/hr overnight. Family at bedside. Will continue with POC.

## 2019-01-21 NOTE — PROGRESS NOTES
Author and AprilGAYLE, gave patient CHG bed bath, applied lotion to all extremities, completed extensive passive ROM, changed bedding, repositioned, and initiated tube feedings at 0500.

## 2019-01-21 NOTE — PLAN OF CARE
5C PT: PT orders acknowledged and appreciated. Per chart review and discussion with OT, pt is wc dependent at baseline 2/2 C5 quadriplegia, performs pivot transfers with assist. Pt with no skilled acute PT needs at this time, OT to continue to follow for mobility and transfers, PT to defer and complete orders. Please re-consult if needs arise.
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
## 2019-01-21 NOTE — PROGRESS NOTES
Pt is alert and oriented x 4, tube feeding running at 50 ml/hr, patent, pt wants to wait to increase rate otherwise denies any signs of tolerating the feedings. Continue on IV ABX, lungs CTA. Suctioned orally frequently. S/p catheter patent, yellowish urine. Turned and repositioned q 2 hours. Will continue to monitor pt.

## 2019-01-21 NOTE — PROGRESS NOTES
Kenya looks better.  She is frustrated at having to be here, but can wait two more days for swallow study.  Pain improved, neck is not tender.  She is afebrile x 72 hours and the WBC is down to 6.9.      Will await barium swallow on Wed.

## 2019-01-21 NOTE — PROGRESS NOTES
"Otolaryngology Progress Note  January 21, 2019    S: No acute events overnight. Wants to bathe and has been having a difficult time finding a place to do so. Neck pain improved.     O: BP 96/77 (BP Location: Left arm)   Pulse 65   Temp 98.2  F (36.8  C) (Oral)   Resp 16   Ht 1.702 m (5' 7\")   Wt 53.8 kg (118 lb 8 oz)   SpO2 99%   BMI 18.56 kg/m    Gen: Sleeping, easily awoken, comfortable  Resp: nonlabored breathing on room air  Neck: Soft, slightly tender on the left side. No swelling, erythema, fluctuance or crepitance. No palpable LAD  Face: HB1 bilaterally  Ears: No otorrhea  Nose: No rhinorrhea    Intake/Output Summary (Last 24 hours) at 1/21/2019 0750  Last data filed at 1/21/2019 0700  Gross per 24 hour   Intake --   Output 1300 ml   Net -1300 ml       LABS:  ROUTINE IP LABS (Last four results)  BMP  Recent Labs   Lab 01/20/19  0724 01/19/19  0616 01/18/19  0603 01/17/19  0903    138 135  --    POTASSIUM 3.6 3.6 3.6 3.7   CHLORIDE 105 105 100  --    SUGEY 8.2* 8.2* 8.1*  --    CO2 24 25 24  --    BUN 4* 6* 6* 7   CR 0.28* 0.33* 0.34*  --    GLC 94 115* 98  --      CBC  Recent Labs   Lab 01/20/19  0724 01/19/19  0616 01/18/19  0603 01/17/19  0903   WBC 9.5 14.3* 15.5* 8.7   RBC 3.93 4.26 4.04 4.41   HGB 11.2* 12.4 11.6* 12.8   HCT 35.7 38.8 36.0 38.9   MCV 91 91 89 88   MCH 28.5 29.1 28.7 29.0   MCHC 31.4* 32.0 32.2 32.9   RDW 14.1 14.4 14.2 14.0    171 174 206     INR  Recent Labs   Lab 01/17/19  0903   INR 1.10     A/P:  39yo female with a traction diverticulum secondary to anterior spinal hardware now POD3 s/p flexible endoscopic diverticulotomy which was complicated post-operatively by an esophageal leak. There is air within the neck and into the mediastinum but no evidence of a focal abscess on CT scan, however she does have a leukocytosis and there is evidence of possible early mediastinitis on CT scan. Exam unchanged this AM. NG tube has been placed. Thoracic surgery has been " consulted.      - Agree with IV antibiotics  - ENT is available to assist with closure of the esophageal leak if GI determines that this is necessary  - ENT is available to assist in I&D if an abscess develops     Patient discussed with Dr. Marsha Omalley MD  Otolaryngology-Head & Neck Surgery PGY1  Please contact ENT with questions by dialing [phone] and entering job code [phone] when prompted.

## 2019-01-21 NOTE — PLAN OF CARE
Pt was up in shower chair today and in wheelchair playing cards with family. Pt has Video swallow with esophagram ordered not sure which day they are planning wed or Monday pt needs to be NPO 4 hours prior no TF. Pt wasn't to do enema later today after company leaves.

## 2019-01-21 NOTE — PROGRESS NOTES
"[facility] Inpatient Pressure Injury Assessment   Reason for consultation: Evaluate and treat coccyx wound      ASSESSMENT  Pressure Injury: on Left side of coccyx , hospital acquired ,   Pressure Injury is Stage Deep Tissue Pressure Injury (DTPI)   Contributing factor of the pressure injury: immobility  Status: initial assessment     TREATMENT PLAN  coccyx wound: Every 3 days   Cleanse with MicroKlenz moistened gauze   Pat dry.   Apply no sting barrier film to the chava wound skin allow to dry   Cover with  4 x4  Mepilex dressing    PIP ACTIVITY MEASURES:  If pt is refusing to turn or reposition they must be educated on the  potential injury from not off loading pressure.  Then this \"educated refusal\" needs to be documented as an \"educated refusal to turn/ reposition\" and document if alert, etc. Additionally, you MUST notify the charge nurse, nurse manager and the provider of the pt's refusal to reposition.  Follow Akash Risk recommendations      CHAIR: Pt should sit on a chair cushion ([id number]) when up to the chair and not sit for more than one hour at a time before fully offloading backside (either stand for a couple of minutes and/or return to bed, positioning on a side) to relieve pressure and re-perfuse tissue.  Additionally, encourage pt to shift side to side every 15 minutes, too.        BED:  Reposition side to side every 1-2 hours when awake.     No direct supine positioning, position only side to side    Keep heels elevated    As able keep HOB below 30 degrees    Orders Written  [facility] follow-up plan:twice weekly  Nursing to notify the Provider(s) and re-consult the WO Nurse if wound(s) deteriorates or new skin concern.    Patient History  According to provider note(s):  38 year old female, who has a history of Zenker's diverticulum and C5 quadriplegia who was admitted on 1/17/2019 for overnight observation after flex endoscopic diverticulutomy, found to have a leak- Strict NPO, NGT in place with " continuous TF     Objective Data  Containment of urine/stool: Suprapublic catheter    Current Diet/ Nutrition:  Orders Placed This Encounter      Diet      NPO for Medical/Clinical Reasons Except for: NPO but receiving Tube Feeding      Output:   I/O last 3 completed shifts:  In: -   Out: 550 [Urine:550]    Risk Assessment:   Sensory Perception: 3-->slightly limited  Moisture: 3-->occasionally moist  Activity: 2-->chairfast  Mobility: 2-->very limited  Nutrition: 3-->adequate  Friction and Shear: 2-->potential problem  Akash Score: 15      Labs:   Recent Labs   Lab 01/21/19  0604  01/17/19  0903   HGB 11.3*   < > 12.8   INR  --   --  1.10   WBC 6.9   < > 8.7   CRP 79.0*   < >  --     < > = values in this interval not displayed.       Physical Exam  Skin inspection: focused buttocks   Patient is high risk for pressure injury development secondary to quadriplegia    Wound Location:  Left side of coccyx  Date of last Photo: camera not working  Wound History: pt has a history of coccyx pressure injury, unknown stage.  she has an air mattress at home and a pressure mapped cushion in her tilt in space wheel chair.    Pt admitted to observation unit on 1/17, low air loss mattress on bed early AM 1/18.   Measurements (length x width x depth, in cm) 1 cm x 0.8 cm  x  0 cm   Wound Base:  100 % nonblanchable purple surrounded by approximately 10 cm x 4 cm area of bright pink, blanchable erythema  Palpation of the wound bed: normal   Temperature: normal   Pain: insensate     Interventions  Current support surface: Standard  Low air loss mattress  Current off-loading measures: Pillows under calves  Repositioning aid: Pillows  Visual inspection of wound(s) completed   Wound Care: was done per plan of care.  Supplies: gathered  Educated provided: importance of repositioning and plan of care  Education provided to: patient  and family member mother  Discussed importance of:repositioning every 2 hours, off-loading pressure to  wound, their role in pressure injury prevention, head elevation <30 degrees and off-loading mattress  Discussed plan of care with Nurse

## 2019-01-21 NOTE — PLAN OF CARE
SLP consult for videoswallow study received. Per Becky's MD the patient is to remains strict NPO at this time, so evaluation not yet scheduled. Will check back 1/22/19 and can schedule videoswallow study if medically appropriate.

## 2019-01-21 NOTE — PROGRESS NOTES
"Otolaryngology Progress Note  January 21, 2019    S: No acute events overnight. Has remained afebrile. Reports neck pain has resolved. No chest pain. Tolerating tube feeds. Would really like to take a shower or wash her hair today.    O: BP 96/77 (BP Location: Left arm)   Pulse 65   Temp 98.2  F (36.8  C) (Oral)   Resp 16   Ht 1.702 m (5' 7\")   Wt 53.8 kg (118 lb 8 oz)   SpO2 99%   BMI 18.56 kg/m    Gen: Laying in bed sleeping, easily awoken  Resp: nonlabored breathing on room air  Neck: Soft, nontender to palpation. No swelling, erythema, fluctuance or crepitance. No palpable LAD  Face: HB1 bilaterally  Ears: No otorrhea  Nose: No rhinorrhea    Intake/Output Summary (Last 24 hours) at 1/21/2019 0751  Last data filed at 1/21/2019 0700  Gross per 24 hour   Intake --   Output 1300 ml   Net -1300 ml     LABS:  ROUTINE IP LABS (Last four results)  BMP  Recent Labs   Lab 01/20/19  0724 01/19/19  0616 01/18/19  0603 01/17/19  0903    138 135  --    POTASSIUM 3.6 3.6 3.6 3.7   CHLORIDE 105 105 100  --    SUGEY 8.2* 8.2* 8.1*  --    CO2 24 25 24  --    BUN 4* 6* 6* 7   CR 0.28* 0.33* 0.34*  --    GLC 94 115* 98  --      CBC  Recent Labs   Lab 01/20/19  0724 01/19/19  0616 01/18/19  0603 01/17/19  0903   WBC 9.5 14.3* 15.5* 8.7   RBC 3.93 4.26 4.04 4.41   HGB 11.2* 12.4 11.6* 12.8   HCT 35.7 38.8 36.0 38.9   MCV 91 91 89 88   MCH 28.5 29.1 28.7 29.0   MCHC 31.4* 32.0 32.2 32.9   RDW 14.1 14.4 14.2 14.0    171 174 206     INR  Recent Labs   Lab 01/17/19  0903   INR 1.10     A/P:  39yo female with a traction diverticulum secondary to anterior spinal hardware now POD3 s/p flexible endoscopic diverticulotomy which was complicated post-operatively by an esophageal leak. There is air within the neck and into the mediastinum but no evidence of a focal abscess on CT scan, however she did have a leukocytosis with evidence of possible early mediastinitis on CT scan. She has remained afebrile and her WBC has trended " down on antimicrobials. Exam has improved.    - Agree with repeat swallow study mid-week  - Agree with IV antibiotics and antifungals per Thoracic Surgery recs  - Agree with continued tube feeding, close surveillance for nausea  - ENT is available to assist with closure of the esophageal leak if GI determines that this is necessary  - ENT is available to assist in I&D if an abscess develops     Patient discussed with Dr. Marsha Mares MD  Otolaryngology-Head & Neck Surgery PGY2  Please contact ENT with questions by dialing [phone] and entering job code [phone] when prompted.

## 2019-01-21 NOTE — PROGRESS NOTES
"Pt s/p Diverticulotomy with post op complication of air leak in esophagus found on Esophogram. Rpeat Esophogram 1/22 or 1/23. Pt on strict NPO, receiving TF restarted this AM at 0500 at 50ml/hr, able to be increased to 60ml/hr at 0800, and bumped up to goal rate of 65ml/hr 6hrs after. Team OK with 16hrs TF during the day with 2hrs rest before 6hrs lying flat to sleep overnight. D5 with K+ running at 50ml/hr, Zosyn given overnight. Pt turned q1-2hrs w/ assist of 2, suctioned frequently while awake, suprapubic catheter draining adequately. Pt reports tolerable pain at the moment. AxOx4, VSS, LS clear anteriorly, no BM this shift. Pressure injury on coccyx remains covered w/ Mepiplex. Will continue to monitor.     BP 96/77 (BP Location: Left arm)   Pulse 65   Temp 98.2  F (36.8  C) (Oral)   Resp 16   Ht 1.702 m (5' 7\")   Wt 53.8 kg (118 lb 8 oz)   SpO2 99%   BMI 18.56 kg/m      "

## 2019-01-21 NOTE — PROGRESS NOTES
01/21/19 1500   Quick Adds   Type of Visit Initial Occupational Therapy Evaluation   Living Environment   Lives With significant other   Living Arrangements house   Home Accessibility no concerns   Living Environment Comment Pt lives in a house with her significant other.    Self-Care   Usual Activity Tolerance moderate   Current Activity Tolerance fair   Equipment Currently Used at Home wheelchair, power;shower chair;hospital bed   Activity/Exercise/Self-Care Comment Pt is wc bound and pivots at baseline. Pt receives ~8 hours of PCA services per day to assist with morning and evening cares.    Functional Level   Ambulation 4-->completely dependent   Transferring 3-->assistive equipment and person   Toileting 0-->independent   Bathing 3-->assistive equipment and person   Dressing 2-->assistive person   Eating 0-->independent   Communication 0-->understands/communicates without difficulty   Cognition 0 - no cognition issues reported   Fall history within last six months no   Which of the above functional risks had a recent onset or change? transferring       Present no   Language english   General Information   Onset of Illness/Injury or Date of Surgery - Date 01/17/19   Referring Physician Germain Cuevas MD   Patient/Family Goals Statement To return home   Additional Occupational Profile Info/Pertinent History of Current Problem Kenya Villalta is a 38 year old female with a history of Zenker's diverticulum and C5 quadriplegia who was admitted on 1/17/2019 for overnight observation after flex endoscopic diverticulutomy.  The following problems were addressed during her hospitalization:   General Observations Pt is pleasant and agreeable to therapy   General Info Comments Activity: up in chair   Cognitive Status Examination   Orientation orientation to person, place and time   Level of Consciousness alert   Follows Commands (Cognition) WNL   Memory intact   Attention No deficits were  identified   Organization/Problem Solving No deficits were identified   Executive Function No deficits were identified   Cognitive Comment Pt is alert, oriented and generally appropriate in conversation   Visual Perception   Visual Perception No deficits were identified   Sensory Examination   Sensory Quick Adds No deficits were identified   Pain Assessment   Patient Currently in Pain No   Integumentary/Edema   Integumentary/Edema no deficits were identifed   Range of Motion (ROM)   ROM Comment BUE ROM limited due to C5 spinal cord injury   Strength   Strength Comments Not formally assessed   Hand Strength   Hand Strength Comments Not formally assessed   Instrumental Activities of Daily Living (IADL)   Previous Responsibilities meal prep;housekeeping;laundry;shopping;medication management;driving;work   IADL Comments Pt was previously mod I with IADL completion. Pt drives and works part time.    Activities of Daily Living Analysis   Impairments Contributing to Impaired Activities of Daily Living strength decreased   General Therapy Interventions   Planned Therapy Interventions ADL retraining;IADL retraining;ROM;strengthening;home program guidelines;progressive activity/exercise;transfer training   Clinical Impression   Criteria for Skilled Therapeutic Interventions Met yes, treatment indicated   OT Diagnosis decreased activity tolerance and independence with transfers   Influenced by the following impairments weakness, deconditioning, fatigue   Assessment of Occupational Performance 3-5 Performance Deficits   Identified Performance Deficits functional transfers, g/h, dressing   Clinical Decision Making (Complexity) Low complexity   Therapy Frequency other (see comments)  (6x per week)   Predicted Duration of Therapy Intervention (days/wks) 1/28/19   Anticipated Equipment Needs at Discharge other (see comments)  (TBD)   Anticipated Discharge Disposition Home with Assist;Home with Home Therapy   Risks and Benefits of  "Treatment have been explained. Yes   Patient, Family & other staff in agreement with plan of care Yes   [facility] TM \"6 Clicks\"   2016, Trustees of [facility], under license to [employer].  All rights reserved.   6 Clicks Short Forms Daily Activity Inpatient Short Form   [facility]  \"6 Clicks\" Daily Activity Inpatient Short Form   1. Putting on and taking off regular lower body clothing? 1 - Total   2. Bathing (including washing, rinsing, drying)? 2 - A Lot   3. Toileting, which includes using toilet, bedpan or urinal? 1 - Total   4. Putting on and taking off regular upper body clothing? 3 - A Little   5. Taking care of personal grooming such as brushing teeth? 3 - A Little   6. Eating meals? 3 - A Little   Daily Activity Raw Score (Score out of 24.Lower scores equate to lower levels of function) 13   Total Evaluation Time   Total Evaluation Time (Minutes) 5     "

## 2019-01-21 NOTE — CONSULTS
Please see dedicated ENT consultation note by Fer Mares MD 1/19/2019.    Elizabeth Castillo PA-C  Otolaryngology-Head & Neck Surgery  Please contact ENT by dialing [phone] and entering job code [phone].
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
## 2019-01-22 ENCOUNTER — APPOINTMENT (OUTPATIENT)
Dept: OCCUPATIONAL THERAPY | Facility: CLINIC | Age: 39
DRG: 326 | End: 2019-01-22
Attending: INTERNAL MEDICINE
Payer: MEDICARE

## 2019-01-22 PROBLEM — K59.09 OTHER CONSTIPATION: Status: ACTIVE | Noted: 2019-01-22

## 2019-01-22 LAB
ANION GAP SERPL CALCULATED.3IONS-SCNC: 8 MMOL/L (ref 3–14)
BASOPHILS # BLD AUTO: 0 10E9/L (ref 0–0.2)
BASOPHILS NFR BLD AUTO: 0.5 %
BUN SERPL-MCNC: 7 MG/DL (ref 7–30)
CALCIUM SERPL-MCNC: 8.9 MG/DL (ref 8.5–10.1)
CHLORIDE SERPL-SCNC: 104 MMOL/L (ref 94–109)
CO2 SERPL-SCNC: 28 MMOL/L (ref 20–32)
CREAT SERPL-MCNC: 0.36 MG/DL (ref 0.52–1.04)
CRP SERPL-MCNC: 41 MG/L (ref 0–8)
DIFFERENTIAL METHOD BLD: NORMAL
EOSINOPHIL # BLD AUTO: 0.3 10E9/L (ref 0–0.7)
EOSINOPHIL NFR BLD AUTO: 3.8 %
ERYTHROCYTE [DISTWIDTH] IN BLOOD BY AUTOMATED COUNT: 13.8 % (ref 10–15)
GFR SERPL CREATININE-BSD FRML MDRD: >90 ML/MIN/{1.73_M2}
GLUCOSE SERPL-MCNC: 91 MG/DL (ref 70–99)
HCT VFR BLD AUTO: 40.1 % (ref 35–47)
HGB BLD-MCNC: 12.7 G/DL (ref 11.7–15.7)
IMM GRANULOCYTES # BLD: 0 10E9/L (ref 0–0.4)
IMM GRANULOCYTES NFR BLD: 0.3 %
LYMPHOCYTES # BLD AUTO: 2.3 10E9/L (ref 0.8–5.3)
LYMPHOCYTES NFR BLD AUTO: 30 %
MCH RBC QN AUTO: 28.8 PG (ref 26.5–33)
MCHC RBC AUTO-ENTMCNC: 31.7 G/DL (ref 31.5–36.5)
MCV RBC AUTO: 91 FL (ref 78–100)
MONOCYTES # BLD AUTO: 0.7 10E9/L (ref 0–1.3)
MONOCYTES NFR BLD AUTO: 8.9 %
NEUTROPHILS # BLD AUTO: 4.4 10E9/L (ref 1.6–8.3)
NEUTROPHILS NFR BLD AUTO: 56.5 %
NRBC # BLD AUTO: 0 10*3/UL
NRBC BLD AUTO-RTO: 0 /100
PLATELET # BLD AUTO: 279 10E9/L (ref 150–450)
POTASSIUM SERPL-SCNC: 3.6 MMOL/L (ref 3.4–5.3)
RBC # BLD AUTO: 4.41 10E12/L (ref 3.8–5.2)
SODIUM SERPL-SCNC: 140 MMOL/L (ref 133–144)
WBC # BLD AUTO: 7.7 10E9/L (ref 4–11)

## 2019-01-22 PROCEDURE — 36415 COLL VENOUS BLD VENIPUNCTURE: CPT | Performed by: FAMILY MEDICINE

## 2019-01-22 PROCEDURE — 80048 BASIC METABOLIC PNL TOTAL CA: CPT | Performed by: FAMILY MEDICINE

## 2019-01-22 PROCEDURE — 86140 C-REACTIVE PROTEIN: CPT | Performed by: FAMILY MEDICINE

## 2019-01-22 PROCEDURE — C9113 INJ PANTOPRAZOLE SODIUM, VIA: HCPCS | Performed by: FAMILY MEDICINE

## 2019-01-22 PROCEDURE — 12000012 ZZH R&B MS OVERFLOW UMMC

## 2019-01-22 PROCEDURE — 27210429 ZZH NUTRITION PRODUCT INTERMEDIATE LITER

## 2019-01-22 PROCEDURE — A9270 NON-COVERED ITEM OR SERVICE: HCPCS | Mod: GY | Performed by: STUDENT IN AN ORGANIZED HEALTH CARE EDUCATION/TRAINING PROGRAM

## 2019-01-22 PROCEDURE — 85025 COMPLETE CBC W/AUTO DIFF WBC: CPT | Performed by: FAMILY MEDICINE

## 2019-01-22 PROCEDURE — 25000132 ZZH RX MED GY IP 250 OP 250 PS 637: Mod: GY | Performed by: FAMILY MEDICINE

## 2019-01-22 PROCEDURE — 25000128 H RX IP 250 OP 636: Performed by: STUDENT IN AN ORGANIZED HEALTH CARE EDUCATION/TRAINING PROGRAM

## 2019-01-22 PROCEDURE — 25000132 ZZH RX MED GY IP 250 OP 250 PS 637: Mod: GY | Performed by: STUDENT IN AN ORGANIZED HEALTH CARE EDUCATION/TRAINING PROGRAM

## 2019-01-22 PROCEDURE — 25000128 H RX IP 250 OP 636: Performed by: FAMILY MEDICINE

## 2019-01-22 PROCEDURE — 40000133 ZZH STATISTIC OT WARD VISIT

## 2019-01-22 PROCEDURE — 97530 THERAPEUTIC ACTIVITIES: CPT | Mod: GO

## 2019-01-22 RX ORDER — OXYCODONE HCL 5 MG/5 ML
5 SOLUTION, ORAL ORAL EVERY 6 HOURS PRN
Status: DISCONTINUED | OUTPATIENT
Start: 2019-01-22 | End: 2019-01-24 | Stop reason: HOSPADM

## 2019-01-22 RX ADMIN — PIPERACILLIN SODIUM,TAZOBACTAM SODIUM 3.38 G: 3; .375 INJECTION, POWDER, FOR SOLUTION INTRAVENOUS at 20:34

## 2019-01-22 RX ADMIN — PIPERACILLIN SODIUM,TAZOBACTAM SODIUM 3.38 G: 3; .375 INJECTION, POWDER, FOR SOLUTION INTRAVENOUS at 03:21

## 2019-01-22 RX ADMIN — OXYCODONE HYDROCHLORIDE 5 MG: 5 SOLUTION ORAL at 16:30

## 2019-01-22 RX ADMIN — Medication 20 MG: at 16:30

## 2019-01-22 RX ADMIN — MULTIVITAMIN 15 ML: LIQUID ORAL at 09:07

## 2019-01-22 RX ADMIN — PIPERACILLIN SODIUM,TAZOBACTAM SODIUM 3.38 G: 3; .375 INJECTION, POWDER, FOR SOLUTION INTRAVENOUS at 14:15

## 2019-01-22 RX ADMIN — BISACODYL 10 MG: 10 SUPPOSITORY RECTAL at 04:05

## 2019-01-22 RX ADMIN — Medication 20 MG: at 12:45

## 2019-01-22 RX ADMIN — FLUCONAZOLE 400 MG: 400 INJECTION, SOLUTION INTRAVENOUS at 14:15

## 2019-01-22 RX ADMIN — Medication 20 MG: at 09:07

## 2019-01-22 RX ADMIN — Medication 220 MG: at 14:15

## 2019-01-22 RX ADMIN — PIPERACILLIN SODIUM,TAZOBACTAM SODIUM 3.38 G: 3; .375 INJECTION, POWDER, FOR SOLUTION INTRAVENOUS at 08:01

## 2019-01-22 RX ADMIN — Medication 220 MG: at 09:21

## 2019-01-22 RX ADMIN — Medication 20 MG: at 20:35

## 2019-01-22 RX ADMIN — Medication 220 MG: at 20:35

## 2019-01-22 RX ADMIN — ONDANSETRON 4 MG: 2 INJECTION INTRAMUSCULAR; INTRAVENOUS at 09:29

## 2019-01-22 RX ADMIN — PANTOPRAZOLE SODIUM 40 MG: 40 INJECTION, POWDER, FOR SOLUTION INTRAVENOUS at 08:02

## 2019-01-22 ASSESSMENT — ENCOUNTER SYMPTOMS
FEVER: 0
NAUSEA: 0
ABDOMINAL PAIN: 0
EYES NEGATIVE: 1
CHILLS: 0
SORE THROAT: 0
VOMITING: 0

## 2019-01-22 ASSESSMENT — ACTIVITIES OF DAILY LIVING (ADL)
ADLS_ACUITY_SCORE: 38

## 2019-01-22 ASSESSMENT — MIFFLIN-ST. JEOR: SCORE: 1248.78

## 2019-01-22 NOTE — PROGRESS NOTES
CLINICAL NUTRITION SERVICES - REASSESSMENT NOTE     Nutrition Prescription    RECOMMENDATIONS FOR MDs/PROVIDERS TO ORDER:  None at this time.    Malnutrition Status:    Patient does not meet two of the above criteria necessary for diagnosing malnutrition but is at risk for malnutrition    Recommendations already ordered by Registered Dietitian (RD):  1. Weigh pt (no wt since admission on 1/17/19)  2. Continue TF as ordered.     Future/Additional Recommendations:  1. Monitor video swallow study diet recs - If appropriate, place ONS order to allow for PRN supplements per pt request - pt wants to be able to order supplements.  2. Monitor for new wt  3. Monitor skin integrity (WOC RN following) - DTI on coccyx  4. Monitor Zn sulfate provision (220 mg TID), no end-date and started on 1/20/19 - rec max 10 days.     EVALUATION OF THE PROGRESS TOWARD GOALS   Diet: Strict NPO per primary team; plan for video swallow on 1/23/19    Nutrition Support:   -1/19-1/20: Isosource 1.5 @ goal 45 ml/hr continuous.   -Starting 1/20: Isosource 1.5 @ 65 mL/hr x 16 hours (5a-9p).  mL q4h meet full hydration needs.  -->Enteral Access: NGT placed and confirmed on 1/19.    Intake:   -Enteral: Tolerating goal rate and cycled regimen. No enteral provision records in RN flowsheets until 1/21/19 (recieved 1335 mL this day, which does not seem accurate, given full feeds only provide 1040 mL daily). Per RN notes, TF started on 1/19/19 after NGT placement. TF at goal by 1/20/19, per Gastroenterology brief note.      NEW FINDINGS   Nutrition Hx: Per pt, was eating well PTA; notes that food would regurgitate back up into esophagus and that she would have to re-swallow a couple times. Symptoms started around a decade ago, but progressively worsened until she was aspirating on food as of recent.     Skin: Per WOC RN note on 1/21/19:   Pressure Injury: on Left side of coccyx , hospital acquired ,   Pressure Injury is Stage Deep Tissue Pressure  Injury (DTPI)   Contributing factor of the pressure injury: immobility  Status: initial assessment  -->Note: Certavite ordered, and TF provides 29 kcal/kg and 1.3 g PRO/kg    Labs: Lytes WNL, Cr 0.36 (L, but uptrended)    Meds: Certavite and Zn sulfate 220 mg TID started 1/20/19    GI: 1-2 BMs daily since 1/19/19 per RN flowsheets.    Wt trend: Cannot evaluate, no wt since admission on 1/17/19. Pt denies any recent wt loss.    MALNUTRITION  % Intake: Decreased intake does not meet criteria, TF now at goal  % Weight Loss: Unable to assess - although, pt denies any wt loss.   Subcutaneous Fat Loss: None observed  Muscle Loss: None observed - poor muscle tone in BLE related to quadriplegia vs malnutrition  Fluid Accumulation/Edema: None noted  Malnutrition Diagnosis: Patient does not meet two of the above criteria necessary for diagnosing malnutrition but is at risk for malnutrition    Previous Goals   Total avg nutritional intake to meet a minimum of 25 kcal/kg and 1.2 g PRO/kg daily (per dosing wt 54 kg).  Evaluation: Likely met, as pt at TF goal since 1/20/19.    Previous Nutrition Diagnosis  Inadequate protein-energy intake related to NPO diet status as evidenced by NPO diet (x3d), which provides 0% of estimated nutritional needs.     Evaluation: No change    CURRENT NUTRITION DIAGNOSIS  Inadequate oral intake related to continued NPO in setting of esophageal anastomotic leak as evidenced by pt continued reliance on enteral nutrition to meet 100% nutrition needs.       INTERVENTIONS  Implementation  -Enteral Nutrition - Continue as ordered  -Multivitamin/mineral supplement therapy - Continue certavite  -Nutrition education - Discussed ONS available (provided list). Pt was told by primary team that would need to follow a liquid diet for a couple days or so after video swallow planned on 1/23.     Goals  Total avg nutritional intake to meet a minimum of 25 kcal/kg and 1.2 g PRO/kg daily (per dosing wt 54  kg).    Monitoring/Evaluation  Progress toward goals will be monitored and evaluated per protocol.    Mehnaz Lacy RD, LD  Pager: [phone]

## 2019-01-22 NOTE — PROGRESS NOTES
"Otolaryngology Progress Note  January 22, 2019    S: No acute events overnight. Has remained afebrile. Denies any concerns this morning.     O: /60 (BP Location: Left arm)   Pulse 57   Temp 98.1  F (36.7  C) (Axillary)   Resp 16   Ht 1.702 m (5' 7\")   Wt 53.8 kg (118 lb 8 oz)   SpO2 98%   BMI 18.56 kg/m    Gen: Laying in bed, alert, NAD  Resp: nonlabored breathing on room air  Neck: Soft, nontender to palpation. No swelling, erythema, fluctuance or crepitance. No palpable LAD  Face: HB1 bilaterally  Ears: No otorrhea  Nose: No rhinorrhea    Intake/Output Summary (Last 24 hours) at 1/22/2019 1103  Last data filed at 1/22/2019 1100  Gross per 24 hour   Intake 2155 ml   Output 1225 ml   Net 930 ml       LABS:  ROUTINE IP LABS (Last four results)  BMP  Recent Labs   Lab 01/22/19  0536 01/21/19  0604 01/20/19  0724 01/19/19  0616    139 136 138   POTASSIUM 3.6 3.8 3.6 3.6   CHLORIDE 104 108 105 105   SUGEY 8.9 8.4* 8.2* 8.2*   CO2 28 26 24 25   BUN 7 4* 4* 6*   CR 0.36* 0.31* 0.28* 0.33*   GLC 91 100* 94 115*     CBC  Recent Labs   Lab 01/22/19  0536 01/21/19  0604 01/20/19  0724 01/19/19  0616   WBC 7.7 6.9 9.5 14.3*   RBC 4.41 3.97 3.93 4.26   HGB 12.7 11.3* 11.2* 12.4   HCT 40.1 35.9 35.7 38.8   MCV 91 90 91 91   MCH 28.8 28.5 28.5 29.1   MCHC 31.7 31.5 31.4* 32.0   RDW 13.8 13.8 14.1 14.4    205 189 171     INR  Recent Labs   Lab 01/17/19  0903   INR 1.10     Results for WALDEMAR MENDEZ (MRN [id number]) as of 1/22/2019 11:04   Ref. Range 1/20/2019 07:24 1/21/2019 06:04 1/22/2019 05:36   CRP Inflammation Latest Ref Range: 0.0 - 8.0 mg/L 140.0 (H) 79.0 (H) 41.0 (H)     A/P:  39yo female with a traction diverticulum secondary to anterior spinal hardware now POD4 s/p flexible endoscopic diverticulotomy which was complicated post-operatively by an esophageal leak. There is air within the neck and into the mediastinum but no evidence of a focal abscess on CT scan, however she did have a leukocytosis " with evidence of possible early mediastinitis on CT scan. She has remained afebrile and her WBC has trended down on antimicrobials. Exam has improved.    - Agree with repeat swallow study tomorrow  - Agree with IV antibiotics and antifungals per Thoracic Surgery recs  - Agree with NPO status and continued tube feeding, close surveillance for nausea  - ENT is available to assist with closure of the esophageal leak if GI determines that this is necessary  - ENT is available to assist in I&D if an abscess develops  - Will continue to follow, primary cares per medicine team     Patient discussed with Dr. Marsha Castillo PA-C  Otolaryngology-Head & Neck Surgery   Please contact ENT with questions by dialing [phone] and entering job code [phone] when prompted.
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
## 2019-01-22 NOTE — PLAN OF CARE
Afebrile, all other vital signs stable thus far this shift. No complaints of dizziness, SOB, N/V or pain. At beginning of shift patient reported feeling/was visibly anxious- MD notified and received order for PRN Ativan. 1mg Ativan given via NG tube, see MAR- patient much more content and comfortable. Tube feeds stopped around 2100. Being suctioned PRN. Patient being turned q2hrs. Suprapubic catheter draining adequately. Pt requested PRN Dulcolax suppository this AM for constipation, see MAR. Awaiting lab results this AM. Mother at bedside throughout the night. No further complaints. Will continue to monitor.     Adult Inpatient Plan of Care  Plan of Care Review  1/22/2019 0515 - No Change by Rita Romeo RN     Adult Inpatient Plan of Care  Absence of Hospital-Acquired Illness or Injury  1/22/2019 0515 - No Change by Rita Romeo RN     Adult Inpatient Plan of Care  Optimal Comfort and Wellbeing  1/22/2019 0515 - No Change by Rita Romeo RN     Adult Inpatient Plan of Care  Readiness for Transition of Care  1/22/2019 0515 - No Change by Rita Romeo RN     Fall Injury Risk  Absence of Fall and Fall-Related Injury  1/22/2019 0515 - No Change by Rita Romeo RN     Constipation  Effective Bowel Elimination  1/22/2019 0515 - No Change by Rita Romeo RN     Gastrointestinal Condition Comorbidity  Gastrointestinal Condition  Description  Patient comorbidity will be monitored for signs and symptoms of Gastrointestinal condition.  Problems will be absent, minimized or managed by discharge/transition of care.  1/22/2019 0515 - No Change by Rita Romeo RN

## 2019-01-22 NOTE — PLAN OF CARE
I saw Kenya today. She reports doing well without any discomfort. She continues to be afebrile and has been at goal rate with her TFs. Neck exam is not worrisome for collection/abscess/soft tissue infection. White count has been normal and CRP continues to trend down today. Plan is to have a repeat esophagram tomorrow which will hopefully show that the leak has sealed.     If that is the case, then we can resume liquid PO intake for the next 3 days, followed by mechanical soft diet for 3 days, and then a regular diet after that as tolerated. NG could be removed as well. I would favor continuing PO antibiotics and antifungal therapy for an additional 2 weeks similar to treating a deep neck space infection particularly since she has cervical spine hardware. PO Cipro/Flagyl/Fluconazole could be a reasonable choice.     If there is still a persistent leak, then depending on the size we could just wait longer with the patient being NPO or attempt closure.     Jeramie Barahona MD  [facility]  Division of Gastroenterology and Hepatology  [facility] [address] [place], [place] [address]
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
## 2019-01-22 NOTE — PLAN OF CARE
Discharge Planner OT   Patient plan for discharge: Home w/PCA services.   Current status: Pt max A x 1 squat transfer to w/c. Unable to stand at baseline, goals updated as pt reports she is max A x 1 at baseline. Dependent for dressing tasks prior to transfer.   Barriers to return to prior living situation: Medical status.   Recommendations for discharge: Home w/PCA services.   Rationale for recommendations: Is very near functional baseline.        Entered by: Urvashi Escobedo 01/22/2019 9:56 AM     OT 5C    Occupational Therapy Discharge Summary    Reason for therapy discharge:    All goals and outcomes met, no further needs identified.    Progress towards therapy goal(s). See goals on Care Plan in [facility] electronic health record for goal details.  Goals met    Therapy recommendation(s):    Continue home exercise program.PCA assisting with PROM. Father to bring in hand splints.

## 2019-01-22 NOTE — PLAN OF CARE
Patient afebrile, vitals stable. Patient denies pain, has ongoing mild discomfort resolved by repositioning [id number] or more often. Patient suprapupic catheter draining well - patient reports constipation today, enema done with minimal smear of BM. Patient states she may want suppository at later time. Patient NG tube feeds running at goal rate of 65ml/hr. 130ml q4hr flushes done. Patient coccyx pressure dressing covered with mepilex. . Patient resting in bed with call light in reach, Mother at bedside. Will continue to monitor and assess per plan of care.   Adult Inpatient Plan of Care  Plan of Care Review  1/21/2019 1911 - No Change by Sowmya Cedeño RN     Adult Inpatient Plan of Care  Optimal Comfort and Wellbeing  1/21/2019 1911 - No Change by Sowmya Cedeño RN     Fall Injury Risk  Absence of Fall and Fall-Related Injury  1/21/2019 1911 - No Change by Sowmya Cedeño RN     Gastrointestinal Condition Comorbidity  Gastrointestinal Condition  Description  Patient comorbidity will be monitored for signs and symptoms of Gastrointestinal condition.  Problems will be absent, minimized or managed by discharge/transition of care.  1/21/2019 1911 - No Change by Sowmya Cedeño RN

## 2019-01-23 ENCOUNTER — APPOINTMENT (OUTPATIENT)
Dept: GENERAL RADIOLOGY | Facility: CLINIC | Age: 39
DRG: 326 | End: 2019-01-23
Attending: INTERNAL MEDICINE
Payer: MEDICARE

## 2019-01-23 VITALS
TEMPERATURE: 97.7 F | HEIGHT: 67 IN | SYSTOLIC BLOOD PRESSURE: 107 MMHG | RESPIRATION RATE: 18 BRPM | DIASTOLIC BLOOD PRESSURE: 73 MMHG | OXYGEN SATURATION: 98 % | WEIGHT: 118.2 LBS | BODY MASS INDEX: 18.55 KG/M2 | HEART RATE: 65 BPM

## 2019-01-23 LAB
ANION GAP SERPL CALCULATED.3IONS-SCNC: 8 MMOL/L (ref 3–14)
BASOPHILS # BLD AUTO: 0 10E9/L (ref 0–0.2)
BASOPHILS NFR BLD AUTO: 0.4 %
BUN SERPL-MCNC: 9 MG/DL (ref 7–30)
CALCIUM SERPL-MCNC: 8.6 MG/DL (ref 8.5–10.1)
CHLORIDE SERPL-SCNC: 103 MMOL/L (ref 94–109)
CO2 SERPL-SCNC: 29 MMOL/L (ref 20–32)
CREAT SERPL-MCNC: 0.37 MG/DL (ref 0.52–1.04)
CRP SERPL-MCNC: 22 MG/L (ref 0–8)
DIFFERENTIAL METHOD BLD: NORMAL
EOSINOPHIL # BLD AUTO: 0.4 10E9/L (ref 0–0.7)
EOSINOPHIL NFR BLD AUTO: 4.6 %
ERYTHROCYTE [DISTWIDTH] IN BLOOD BY AUTOMATED COUNT: 13.8 % (ref 10–15)
GFR SERPL CREATININE-BSD FRML MDRD: >90 ML/MIN/{1.73_M2}
GLUCOSE BLDC GLUCOMTR-MCNC: 149 MG/DL (ref 70–99)
GLUCOSE SERPL-MCNC: 86 MG/DL (ref 70–99)
HCT VFR BLD AUTO: 36.9 % (ref 35–47)
HGB BLD-MCNC: 11.8 G/DL (ref 11.7–15.7)
IMM GRANULOCYTES # BLD: 0 10E9/L (ref 0–0.4)
IMM GRANULOCYTES NFR BLD: 0.4 %
LYMPHOCYTES # BLD AUTO: 2.9 10E9/L (ref 0.8–5.3)
LYMPHOCYTES NFR BLD AUTO: 35.2 %
MCH RBC QN AUTO: 28.6 PG (ref 26.5–33)
MCHC RBC AUTO-ENTMCNC: 32 G/DL (ref 31.5–36.5)
MCV RBC AUTO: 89 FL (ref 78–100)
MONOCYTES # BLD AUTO: 0.6 10E9/L (ref 0–1.3)
MONOCYTES NFR BLD AUTO: 7.3 %
NEUTROPHILS # BLD AUTO: 4.2 10E9/L (ref 1.6–8.3)
NEUTROPHILS NFR BLD AUTO: 52.1 %
NRBC # BLD AUTO: 0 10*3/UL
NRBC BLD AUTO-RTO: 0 /100
PLATELET # BLD AUTO: 283 10E9/L (ref 150–450)
POTASSIUM SERPL-SCNC: 3.6 MMOL/L (ref 3.4–5.3)
RBC # BLD AUTO: 4.13 10E12/L (ref 3.8–5.2)
SODIUM SERPL-SCNC: 140 MMOL/L (ref 133–144)
WBC # BLD AUTO: 8.1 10E9/L (ref 4–11)

## 2019-01-23 PROCEDURE — 80048 BASIC METABOLIC PNL TOTAL CA: CPT | Performed by: FAMILY MEDICINE

## 2019-01-23 PROCEDURE — C9113 INJ PANTOPRAZOLE SODIUM, VIA: HCPCS | Performed by: FAMILY MEDICINE

## 2019-01-23 PROCEDURE — 27210429 ZZH NUTRITION PRODUCT INTERMEDIATE LITER

## 2019-01-23 PROCEDURE — 25000132 ZZH RX MED GY IP 250 OP 250 PS 637: Mod: GY | Performed by: FAMILY MEDICINE

## 2019-01-23 PROCEDURE — 25000128 H RX IP 250 OP 636: Performed by: FAMILY MEDICINE

## 2019-01-23 PROCEDURE — 86140 C-REACTIVE PROTEIN: CPT | Performed by: FAMILY MEDICINE

## 2019-01-23 PROCEDURE — 74220 X-RAY XM ESOPHAGUS 1CNTRST: CPT

## 2019-01-23 PROCEDURE — 40000895 ZZH STATISTIC SLP IP EVAL DEFER

## 2019-01-23 PROCEDURE — 36415 COLL VENOUS BLD VENIPUNCTURE: CPT | Performed by: FAMILY MEDICINE

## 2019-01-23 PROCEDURE — 85025 COMPLETE CBC W/AUTO DIFF WBC: CPT | Performed by: FAMILY MEDICINE

## 2019-01-23 PROCEDURE — A9270 NON-COVERED ITEM OR SERVICE: HCPCS | Mod: GY | Performed by: STUDENT IN AN ORGANIZED HEALTH CARE EDUCATION/TRAINING PROGRAM

## 2019-01-23 PROCEDURE — 25000132 ZZH RX MED GY IP 250 OP 250 PS 637: Mod: GY | Performed by: STUDENT IN AN ORGANIZED HEALTH CARE EDUCATION/TRAINING PROGRAM

## 2019-01-23 PROCEDURE — 00000146 ZZHCL STATISTIC GLUCOSE BY METER IP

## 2019-01-23 PROCEDURE — 25000128 H RX IP 250 OP 636: Performed by: STUDENT IN AN ORGANIZED HEALTH CARE EDUCATION/TRAINING PROGRAM

## 2019-01-23 RX ORDER — CIPROFLOXACIN 500 MG/5ML
500 KIT ORAL 2 TIMES DAILY
Qty: 140 ML | Refills: 0 | Status: SHIPPED | OUTPATIENT
Start: 2019-01-23 | End: 2019-02-06

## 2019-01-23 RX ORDER — MIRTAZAPINE 15 MG/1
15 TABLET, ORALLY DISINTEGRATING ORAL
Status: DISCONTINUED | OUTPATIENT
Start: 2019-01-23 | End: 2019-01-24 | Stop reason: HOSPADM

## 2019-01-23 RX ORDER — FLUCONAZOLE 40 MG/ML
400 POWDER, FOR SUSPENSION ORAL DAILY
Qty: 140 ML | Refills: 0 | Status: SHIPPED | OUTPATIENT
Start: 2019-01-23 | End: 2019-02-06

## 2019-01-23 RX ADMIN — FLUCONAZOLE 400 MG: 400 INJECTION, SOLUTION INTRAVENOUS at 15:00

## 2019-01-23 RX ADMIN — OXYCODONE HYDROCHLORIDE 5 MG: 5 SOLUTION ORAL at 12:05

## 2019-01-23 RX ADMIN — DIATRIZOATE MEGLUMINE AND DIATRIZOATE SODIUM 120 ML: 660; 100 SOLUTION ORAL; RECTAL at 13:29

## 2019-01-23 RX ADMIN — Medication 0.5 MG: at 00:29

## 2019-01-23 RX ADMIN — Medication 20 MG: at 16:37

## 2019-01-23 RX ADMIN — Medication 20 MG: at 12:13

## 2019-01-23 RX ADMIN — MULTIVITAMIN 15 ML: LIQUID ORAL at 14:04

## 2019-01-23 RX ADMIN — PIPERACILLIN SODIUM,TAZOBACTAM SODIUM 3.38 G: 3; .375 INJECTION, POWDER, FOR SOLUTION INTRAVENOUS at 14:05

## 2019-01-23 RX ADMIN — Medication 20 MG: at 08:54

## 2019-01-23 RX ADMIN — PANTOPRAZOLE SODIUM 40 MG: 40 INJECTION, POWDER, FOR SOLUTION INTRAVENOUS at 08:41

## 2019-01-23 RX ADMIN — PIPERACILLIN SODIUM,TAZOBACTAM SODIUM 3.38 G: 3; .375 INJECTION, POWDER, FOR SOLUTION INTRAVENOUS at 03:33

## 2019-01-23 RX ADMIN — PIPERACILLIN SODIUM,TAZOBACTAM SODIUM 3.38 G: 3; .375 INJECTION, POWDER, FOR SOLUTION INTRAVENOUS at 08:39

## 2019-01-23 RX ADMIN — Medication 220 MG: at 14:04

## 2019-01-23 ASSESSMENT — ENCOUNTER SYMPTOMS
CHILLS: 0
EYES NEGATIVE: 1
VOMITING: 0
ABDOMINAL PAIN: 0
NAUSEA: 0
SORE THROAT: 1
FEVER: 0

## 2019-01-23 ASSESSMENT — ACTIVITIES OF DAILY LIVING (ADL)
ADLS_ACUITY_SCORE: 38

## 2019-01-23 ASSESSMENT — PAIN DESCRIPTION - DESCRIPTORS
DESCRIPTORS: OTHER (COMMENT)
DESCRIPTORS: OTHER (COMMENT)
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
## 2019-01-23 NOTE — PLAN OF CARE
Patient afebrile, OVSS. C/o nausea this am given Zofran x1 with relief. C/o of generalized body pain this afternoon- given Oxycodone x1. Had large incontinent BM this afternoon. TF at 65 ml/hr. PIV on left arm and infusing. She is NPO. Will have Esophagogram tomorrow. Continue with plan of care.

## 2019-01-23 NOTE — PLAN OF CARE
Adult Inpatient Plan of Care  Plan of Care Review  1/23/2019 1600 - No Change by Deanna Richter, RN  Temp max 99.1. Esophagram today at 1 pm. NPO four hours before procedure. Started tf for 1/2 this morning and restarted them after procedure at 14:10 at 65 ml/hr. Occasional suctioning of secretions from mouth. Complained of generalized nerve pain and received oxycodone per NG tube. Bath. Suprapubic catheter in place. Turning every 2 hours. Total care and Assist x 2 using the lift. Mom at bedside and is supportive.

## 2019-01-23 NOTE — PLAN OF CARE
Afebrile. OVSS. Denies pain. Denies n/v/d. Ativan given x1. NG in place. Tube feedings were running at 65 ml/hr, stopped around 2200 last night. Continuing to hold feedings this morning due to esophagram planned for today. Dressing on coccyx remains C/D/I. Using suction prn. Suprapubic catheter in place. Adequate urine output. No bm overnight. Turning q2h. Mother is supportive at bedside. Moving with 2x assist/mechanical lift. Continue plan of care.         Adult Inpatient Plan of Care  Plan of Care Review  1/23/2019 0629 - No Change by Karly Mauro RN  Flowsheets  Taken 1/23/2019 0629   Plan of Care Reviewed With  patient;mother  Progress  no change     Adult Inpatient Plan of Care  Patient-Specific Goal (Individualization)  1/23/2019 0629 - No Change by Karly Mauro RN     Adult Inpatient Plan of Care  Absence of Hospital-Acquired Illness or Injury  1/23/2019 0629 - No Change by Karly Mauro RN     Adult Inpatient Plan of Care  Optimal Comfort and Wellbeing  1/23/2019 0629 - No Change by Karly Mauro RN     Adult Inpatient Plan of Care  Readiness for Transition of Care  1/23/2019 0629 - No Change by Karly Mauro RN     Adult Inpatient Plan of Care  Rounds/Family Conference  1/23/2019 0629 - No Change by Karly Mauro RN     Fall Injury Risk  Absence of Fall and Fall-Related Injury  1/23/2019 0629 - No Change by Karly Mauro RN     Constipation  Effective Bowel Elimination  1/23/2019 0629 - No Change by Karly Mauro RN     Gastrointestinal Condition Comorbidity  Gastrointestinal Condition  Description  Patient comorbidity will be monitored for signs and symptoms of Gastrointestinal condition.  Problems will be absent, minimized or managed by discharge/transition of care.  1/23/2019 0629 - No Change by Karly Mauro RN
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
## 2019-01-23 NOTE — PLAN OF CARE
SLP: Defer - Orders received for ST videoswallow study. Per discussion with radiology attending, VFSS with barium/multiple consistencies not indicated; no penetration or aspiration was observed during VFSS on 12/27/18. Instead, imaging with gastrografin indicated to assess for esophageal perforation/leak s/p flex endoscopic diverticulectomy. ST to complete order. Please re-consult with change in oropharyngeal swallow function/signs and symptoms of aspiration.
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
## 2019-01-23 NOTE — PROGRESS NOTES
Kenya had her esophagram today which showed resolution of her leak. One episode of silent aspiration was seen without cough, this is of unclear significance given NG was still in place. She is otherwise doing well without throat discomfort, neck pain, fevers, or leukocytosis. CRP continues to downtrend. She can be started on liquids today and NG may be removed. If tolerating liquids, then she may be discharged home today. Plan will be for a 2 week course of PO cipro/flagyl/fluconazole. We will follow up with her in clinic as planned with a repeat VFSS. All of this was discussed with the patient and her mother.     Jeramie Barahona MD  [facility]  Division of Gastroenterology and Hepatology  [facility] [address] [place], [place] [address]

## 2019-01-24 ENCOUNTER — PATIENT OUTREACH (OUTPATIENT)
Dept: CARE COORDINATION | Facility: CLINIC | Age: 39
End: 2019-01-24
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
## 2019-01-24 NOTE — PLAN OF CARE
Temp: 97.7  F (36.5  C) Temp src: Axillary BP: 107/73 Pulse: 65 Heart Rate: 62 Resp: 18 SpO2: 98 % O2 Device: None (Room air)       Patient verbalized knowledge regarding new meds, future appointments and diet.  NG and PIV discontinued as ordered.  Discharged at 19:40.

## 2019-02-11 ENCOUNTER — TELEPHONE (OUTPATIENT)
Dept: GASTROENTEROLOGY | Facility: CLINIC | Age: 39
End: 2019-02-11

## 2019-02-11 NOTE — TELEPHONE ENCOUNTER
[facility] Call Center    Phone Message    May a detailed message be left on voicemail: yes    Reason for Call: Other: Pt is requesting a call back to discuss difficulty swallowing that she has been having since her procedure on 1/17. She stated that it is difficult to eat and take medications. Please call Kenya back to advise. Thanks!     Action Taken: Message routed to:  [facility] ([facility] Brady/mainor

## 2019-02-12 ENCOUNTER — TELEPHONE (OUTPATIENT)
Dept: GASTROENTEROLOGY | Facility: CLINIC | Age: 39
End: 2019-02-12

## 2019-02-12 NOTE — TELEPHONE ENCOUNTER
[facility] RN Care Coordination Note:    Called and spoke with patient regarding ongoing symptoms.  Per report she is having issues with swallowing, she states its more with solids than anything. She reports sensation of things getting stuck. She does have some pain with swallowing in general as well. Denies fevers, denies difficulty breathing. She is tolerating the liquid oral intake. Informed her I would discuss these symptoms with Dr. Barahona and contact her back with recommendations.     At this time recommended she stick to softer foods and liquids to ensure she doesn't continue to have the sensation of food getting stuck. Also encouraged her to avoid foods just as steak or harder foods to chew. She verbalized understanding and agrees with plan. She will await our call with further recommendations/evaluation plans.       Page Casanova RN   Care Coordinator   [phone]

## 2019-02-12 NOTE — TELEPHONE ENCOUNTER
[facility] RN Care Coordination Note:    Called and left a message for patient to call back to discuss and assess reported symptoms. Left call back number for patient.     Page Casanova RN   Care Coordinator   [phone]

## 2019-02-12 NOTE — TELEPHONE ENCOUNTER
[facility] Call Center    Phone Message    May a detailed message be left on voicemail: yes    Reason for Call: Other: Patient returning call to Page Casanova. Please call patient back.     Action Taken: Message routed to:  [facility] ([facility] Sharita

## 2019-02-13 NOTE — TELEPHONE ENCOUNTER
[facility] RN Care Coordination Note:    Called and left a message with patient regarding discussion with Dr. Barahona and that the pain is likely d/t clips that were placed during the procedure and that they fall off as the healing continues. Informed her that if she is not able to tolerating oral intake we can push up the video swallow study to a sooner date and evaluation sooner otherwise we can plan to continue to monitor as long as she is continuing to tolerate oral intake. Encouraged a soft diet and to chew food thoroughly to present any impactions. Left my direct number for patient to call back with her preference on how she would like to proceed.     Page Casanova RN   Care Coordinator   [phone]

## 2019-03-13 DIAGNOSIS — K22.5 ZENKER'S DIVERTICULUM: Primary | ICD-10-CM

## 2019-03-20 ENCOUNTER — ANCILLARY PROCEDURE (OUTPATIENT)
Dept: GENERAL RADIOLOGY | Facility: CLINIC | Age: 39
End: 2019-03-20
Payer: MEDICARE

## 2019-03-20 ENCOUNTER — THERAPY VISIT (OUTPATIENT)
Dept: SPEECH THERAPY | Facility: CLINIC | Age: 39
End: 2019-03-20
Payer: MEDICARE

## 2019-03-20 ENCOUNTER — OFFICE VISIT (OUTPATIENT)
Dept: GASTROENTEROLOGY | Facility: CLINIC | Age: 39
End: 2019-03-20
Payer: MEDICARE

## 2019-03-20 VITALS
BODY MASS INDEX: 18.83 KG/M2 | OXYGEN SATURATION: 99 % | HEART RATE: 67 BPM | WEIGHT: 120 LBS | HEIGHT: 67 IN | DIASTOLIC BLOOD PRESSURE: 53 MMHG | SYSTOLIC BLOOD PRESSURE: 91 MMHG

## 2019-03-20 DIAGNOSIS — R13.14 PHARYNGOESOPHAGEAL DYSPHAGIA: ICD-10-CM

## 2019-03-20 DIAGNOSIS — K22.5 ZENKER'S DIVERTICULUM: Primary | ICD-10-CM

## 2019-03-20 DIAGNOSIS — T17.908D ASPIRATION INTO AIRWAY, SUBSEQUENT ENCOUNTER: ICD-10-CM

## 2019-03-20 DIAGNOSIS — K22.5 ZENKER'S DIVERTICULUM: ICD-10-CM

## 2019-03-20 DIAGNOSIS — K22.5 ZENKER DIVERTICULA: ICD-10-CM

## 2019-03-20 RX ORDER — BARIUM SULFATE 400 MG/ML
30 SUSPENSION ORAL ONCE
Status: COMPLETED | OUTPATIENT
Start: 2019-03-20 | End: 2019-03-20

## 2019-03-20 RX ADMIN — BARIUM SULFATE 30 ML: 400 SUSPENSION ORAL at 11:55

## 2019-03-20 ASSESSMENT — MIFFLIN-ST. JEOR: SCORE: 1256.95

## 2019-03-20 ASSESSMENT — PAIN SCALES - GENERAL: PAINLEVEL: NO PAIN (0)

## 2019-03-20 NOTE — NURSING NOTE
"  Chief Complaint   Patient presents with     RECHECK     Follow up     Vitals:    03/20/19 1257   BP: 91/53   Pulse: 67   SpO2: 99%   Weight: 54.4 kg (120 lb)   Height: 1.702 m (5' 7\")     Body mass index is 18.79 kg/m .  Radha Palma CMA    "
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
## 2019-06-16 NOTE — PROGRESS NOTES
03/20/19 1400   General Information   Type Of Visit Initial   Start Of Care Date 03/20/19   Referring Physician Dr. Jeramie Barahona   Orders Evaluate And Treat   Orders Comment Video swallow study with esophagram   Medical Diagnosis Zenker diverticula; aspiration into airway; dysphagia   Onset Of Illness/injury Or Date Of Surgery 11/28/18   Precautions/limitations Swallowing Precautions   Hearing Adequate for conversation   Pertinent History of Current Problem/OT: Additional Occupational Profile Info Kenya Villalta is a 38-year-old female with a PMH significant for spinal cord injury resulting in quadriplegia and Zenker's diverticulum s/p flexible endoscopic diverticulotomy on 1/17/19. Post-op course was complicated by esophageal leak. Pt had NG placed until leak was resolved. Esophagram completed 1/23/19 which demonstrated no leak and pt was started on a clear/full liquid diet. Her diet was slowly advanced over the next couple of weeks to a mechanical soft diet. Pt returned for a repeat VFSS to re-assess her swallow function. She participated in a prior VFSS on 11/28/18 which demonstrated mild to moderate pharyngoesophageal dysphagia. Upon clinical interview today, pt reported she is eats most foods. Stated she does not eat popcorn, other hard/crunchy foods, or meats other than chicken. Stated she does eat fish. She denied feeling of food sticking in her throat when eating or odynophagia. Overall she denied significant concerns with eating. Pt stated on occasion, if she looks down when drinking, she may start choking a few minutes later. Endorsed this has always happened and seems to happen a few times per week. She denied any regurgitation of food/liquid. Lastly, pt denied recently pneumonias, unintentional weight loss, difficulty swallowing pills, and coughing/choking with PO intake.    Respiratory Status Room air   Prior Level Of Function Swallowing   Prior Level Of Function Comment Softer solid foods, thin  liquids; avoids popcorn, hard/crunchy foods, meats other than chicken   General Observations Pt pleasant and cooperative.    Patient/family Goals To swallow without difficulty   Clinical Swallow Evaluation   Oral Musculature generally intact   Dentition present and adequate   Mucosal Quality adequate   Mandibular Strength and Mobility intact   Oral Labial Strength and Mobility WFL   Lingual Strength and Mobility WFL   Velar Elevation intact   Buccal Strength and Mobility intact   Laryngeal Function Swallow;Voicing initiated   Oral Musculature Comments Adequate strength, coordination, and ROM of oral musculature. Pt has previously demonstrated decreased cough strength due to quadriplegia.    VFSS Evaluation   VFSS Additional Documentation Yes   VFSS Eval: Radiology   Radiologist Resident   Views Taken left lateral;A/P   Physical Location of Procedure [facility]   VFSS Eval: Thin Liquid Texture Trial   Mode of Presentation, Thin Liquid straw;fed by clinician   Order of Presentation 1, 2, 5, 7, 9, 10/11-barium tablet, 12, 13  (3&4-no swallows)   Preparatory Phase WFL   Oral Phase, Thin Liquid WFL   Pharyngeal Phase, Thin Liquid Residue in valleculae;other (see comments)  (incomplete epiglottic inversion; diverticulum)   Rosenbek's Penetration Aspiration Scale: Thin Liquid Trial Results 1 - no aspiration, contrast does not enter airway   Diagnostic Statement Prompt swallow response with incomplete epiglottic inversion. No penetration or aspiration. Mild vallecular residuals after the swallow were regurgitated into oral cavity at times and then re-swallowed with minimal difficulty. Other times residuals were reduced with a subsequent dry swallow. Diverticulum present. A large portion of residuals in diverticulum are reduced with a secondary dry swallow. Barium tablet swallowed with water and initially briefly stuck in valleculae which pt then regurgitated. Tablet re-swallowed and became stuck in esophagus in region of  prior diverticulum. Tablet was cleared with an additional sip of liquid.    Successful Strategies Trialed During Procedure, Thin Liquid other (see comments)  (dry swallow)   VFSS Eval: Puree Solid Texture Trial   Mode of Presentation, Puree spoon;fed by clinician   Order of Presentation 6   Preparatory Phase WFL   Oral Phase, Puree WFL   Pharyngeal Phase, Puree Residue in valleculae;Residue in pyriform sinus;Pharyngeal wall coating;other (see comments)  (residue on posterior surface of epiglottis; diverticulum)   Rosenbek's Penetration Aspiration Scale: Puree Food Trial Results 1 - no aspiration, contrast does not enter airway   Diagnostic Statement Prompt swallow response with incomplete epiglottic inversion. No penetration or aspiration. Minimal to mild residuals present in valleculae, on posterior surface of epiglottis, on posterior pharyngeal wall, and in pyriform sinuses. Pt regurgitated some of residuals into oral cavity and re-swallowed. Pharyngeal residuals mostly cleared with subsequent dry swallows. Diverticulum present with increased residuals as compared too sips of thin liquids.    Successful Strategies Trialed During Procedure, Puree other (see comments)  (dry swallows)   VFSS Eval: Solid Food Texture Trial   Mode of Presentation, Solid fed by clinician   Order of Presentation 8   Preparatory Phase WFL   Oral Phase, Solid WFL   Pharyngeal Phase, Solid Residue in valleculae;other (see comments)  (residue on epiglottis; incomplete epiglottic inversion)   Rosenbek's Penetration Aspiration Scale: Solid Food Trial Results 1 - no aspiration, contrast does not enter airway   Diagnostic Statement Prompt swallow response with incomplete epiglottic inversion. Questionable very shallow flash penetration during the swallow but no definitive penetration and no aspiration. Minimal amount of stasis present on epiglottis and in valleculae after the swallow; this was cleared with subsequent dry swallows. Diverticulum  present with residuals after the swallow.    Swallow Compensations   Swallow Compensations Multiple swallow   Educational Assessment   Barriers to Learning No barriers   Esophageal Phase of Swallow   Patient reports or presents with symptoms of esophageal dysphagia Yes   Esophageal sweep performed during today s vidofluoroscopic exam  Yes;Please refer to radiologist's report for details   Esophageal comments Per radiologist, new vs persistent diverticulum present. No back flow into pharynx from diverticulum as seen during previous study.    General Therapy Interventions   Planned Therapy Interventions Dysphagia Treatment   Dysphagia treatment Oropharyngeal exercise training;Modified diet education;Instruction of safe swallow strategies;Compensatory strategies for swallowing   Swallow Eval: Clinical Impressions   Skilled Criteria for Therapy Intervention Skilled criteria met.  Treatment indicated.   Dysphagia Outcome Severity Scale (BRENNAN) Level 5 - BRENNAN   Treatment Diagnosis Mild pharyngoesophageal dysphagia   Diet texture recommendations Dysphagia diet level 2;Thin liquids   Recommended Feeding/Eating Techniques alternate between small bites and sips of food/liquid;maintain upright posture during/after eating for 30 mins;small sips/bites;other (see comments)  (slow pace, oral cares)   Rehab Potential good, to achieve stated therapy goals   Therapy Frequency other (see comments)  (x1 treatment after evaluation)   Anticipated Discharge Disposition home w/ assist   Risks and Benefits of Treatment have been explained. Yes   Patient, family and/or staff in agreement with Plan of Care Yes   Clinical Impression Comments Pt demonstrates mild pharyngoesophagel dysphagia in setting of new vs persistent Zenker's diverticulum. Oral phase is WFL. Pharyngeal phase is characterized by weak pharyngeal constriction and reduced hyolaryngeal elevation. Pt exhibits consistent incomplete epiglottic inversion. She demonstrates minimal to  mild amounts of diffuse pharyngeal residuals after the swallow with all consistencies. Pt inconsistently regurgitates residuals into oral cavity and re-swallows them. Other pharyngeal residuals are mostly cleared with multiple dry swallows. No penetration or aspiration present during today's study; however, pt remains at increased risk due to incomplete epiglottic inversion and reduced cough strength at baseline. Barium tablet swallowed with water and initially became stuck in for valleculae prior to being regurgitated. Pt then re-swallowed pill and it became lodged near previous diverticulum in esophagus. Tablet cleared with an additional swallow of liquid. Esophagram completed after VFSS. Ongoing diverticulum noted with residuals present after the swallow. Residuals in diverticulum are most prominent with puree texture. A large portion of residuals in diverticulum are cleared with additional swallows. Please refer to radiologist report for details. Pt participated in 1x treatment after evaluation and denied additional questions. No additional SLP services are warranted at this time; please re-consult as needed.    Swallow Goals   SLP Swallow Goals 1   Swallow Goal 1   Goal Identifier Education/training   Goal Description 1. Pt will verbalize understanding of results/recommendations of VFSS including diet recommendations, swallowing strategies, and swallowing exercises when given minimal to no cueing.     Target Date 03/20/19   Date Met 03/20/19   Total Session Time   Total Evaluation Time 30     Thank you for the referral of Kenya Villalta. If you have any questions about this report, please contact me using the information below.     Leila Aleman MA, CCC-SLP  Speech-Language Pathologist   [facility]  Department of Otolaryngology/D&T - 4th floor  Pager: [phone]  Phone: [phone]  Email: [email or web address]

## 2019-10-04 ENCOUNTER — HEALTH MAINTENANCE LETTER (OUTPATIENT)
Age: 39
End: 2019-10-04
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
## 2020-02-10 ENCOUNTER — HEALTH MAINTENANCE LETTER (OUTPATIENT)
Age: 40
End: 2020-02-10

## 2020-11-14 ENCOUNTER — HEALTH MAINTENANCE LETTER (OUTPATIENT)
Age: 40
End: 2020-11-14

## 2021-03-28 ENCOUNTER — HEALTH MAINTENANCE LETTER (OUTPATIENT)
Age: 41
End: 2021-03-28

## 2021-09-12 ENCOUNTER — HEALTH MAINTENANCE LETTER (OUTPATIENT)
Age: 41
End: 2021-09-12

## 2022-04-24 ENCOUNTER — HEALTH MAINTENANCE LETTER (OUTPATIENT)
Age: 42
End: 2022-04-24

## 2022-11-19 ENCOUNTER — HEALTH MAINTENANCE LETTER (OUTPATIENT)
Age: 42
End: 2022-11-19

## 2023-06-01 ENCOUNTER — HEALTH MAINTENANCE LETTER (OUTPATIENT)
Age: 43
End: 2023-06-01

## 2025-03-18 ENCOUNTER — MEDICAL CORRESPONDENCE (OUTPATIENT)
Dept: HEALTH INFORMATION MANAGEMENT | Facility: CLINIC | Age: 45
End: 2025-03-18
Payer: MEDICARE

## 2025-03-19 ENCOUNTER — TRANSCRIBE ORDERS (OUTPATIENT)
Dept: OTHER | Age: 45
End: 2025-03-19

## 2025-03-19 DIAGNOSIS — K62.3 RECTAL PROLAPSE: Primary | ICD-10-CM

## (undated) DEVICE — ESU FCP MONOPOLAR COAGRASPER 5MMX165CM FD-410LR

## (undated) DEVICE — ENDO DEVICE LOCKING AND BIOPSY CAP M00545261

## (undated) DEVICE — ATTACHMENT ENDO 12.4X4MM STR ROUND EDGE D-201-11804

## (undated) DEVICE — SOL WATER IRRIG 1000ML BOTTLE 2F7114

## (undated) DEVICE — LABEL MEDICATION SYSTEM 3303-P

## (undated) DEVICE — CLIP HEMOCLIP ENDOSCOPIC INSTINCT 2.8X230CM INSC-7-230-SS

## (undated) DEVICE — KIT ENDO FIRST STEP DISINFECTANT 200ML W/POUCH EP-4

## (undated) DEVICE — ESU GROUND PAD ADULT W/CORD E7507

## (undated) DEVICE — ENDO TUBING CO2 SMARTCAP STERILE DISP 100145CO2EXT

## (undated) DEVICE — TAPE CLOTH 3" CARDINAL 3TRCL03

## (undated) DEVICE — PACK ENDOSCOPY GI CUSTOM UMMC

## (undated) DEVICE — PAD CHUX UNDERPAD 23X24" 7136

## (undated) DEVICE — ENDO CAP AND TUBING STERILE FOR ENDOGATOR  100130

## (undated) DEVICE — SUCTION MANIFOLD DORNOCH ULTRA CART UL-CL500

## (undated) DEVICE — ENDO BITE BLOCK ADULT OMNI-BLOC

## (undated) DEVICE — TUBE NASOGASTRIC FEEDING OVER WIRE 12FRX22" CORFLO 30-4602

## (undated) DEVICE — ESU KNIFE TRIANGLE TIP 4.5X165MM KD-640L

## (undated) DEVICE — GUIDEWIRE TERUMO .035X260 3CM STR TIP GS3504

## (undated) RX ORDER — FENTANYL CITRATE 50 UG/ML
INJECTION, SOLUTION INTRAMUSCULAR; INTRAVENOUS
Status: DISPENSED
Start: 2019-01-19

## (undated) RX ORDER — FENTANYL CITRATE 50 UG/ML
INJECTION, SOLUTION INTRAMUSCULAR; INTRAVENOUS
Status: DISPENSED
Start: 2019-01-17

## (undated) RX ORDER — LIDOCAINE HYDROCHLORIDE 20 MG/ML
INJECTION, SOLUTION EPIDURAL; INFILTRATION; INTRACAUDAL; PERINEURAL
Status: DISPENSED
Start: 2019-01-17

## (undated) RX ORDER — IOPAMIDOL 408 MG/ML
INJECTION, SOLUTION INTRATHECAL
Status: DISPENSED
Start: 2019-01-19

## (undated) RX ORDER — DIPHENHYDRAMINE HYDROCHLORIDE 50 MG/ML
INJECTION INTRAMUSCULAR; INTRAVENOUS
Status: DISPENSED
Start: 2019-01-19